# Patient Record
Sex: FEMALE | Race: WHITE | Employment: OTHER | URBAN - METROPOLITAN AREA
[De-identification: names, ages, dates, MRNs, and addresses within clinical notes are randomized per-mention and may not be internally consistent; named-entity substitution may affect disease eponyms.]

---

## 2017-03-18 ENCOUNTER — HOSPITAL ENCOUNTER (OUTPATIENT)
Age: 72
Discharge: HOME | End: 2017-03-18
Payer: MEDICARE

## 2017-03-18 VITALS
HEIGHT: 61 IN | TEMPERATURE: 98 F | HEART RATE: 67 BPM | OXYGEN SATURATION: 98 % | SYSTOLIC BLOOD PRESSURE: 141 MMHG | WEIGHT: 156 LBS | RESPIRATION RATE: 18 BRPM | DIASTOLIC BLOOD PRESSURE: 83 MMHG | BODY MASS INDEX: 29.45 KG/M2

## 2017-03-18 DIAGNOSIS — N30.01 ACUTE CYSTITIS WITH HEMATURIA: Primary | ICD-10-CM

## 2017-03-18 LAB
URINE BILIRUBIN: NEGATIVE
URINE GLUCOSE: NEGATIVE MG/DL
URINE KETONES: NEGATIVE MG/DL
URINE NITRITE: NEGATIVE
URINE PH: 6.5
URINE SPECIFIC GRAVITY: 1.02
URINE UROBILINOGEN: 0.2 MG/DL

## 2017-03-18 PROCEDURE — 87086 URINE CULTURE/COLONY COUNT: CPT | Performed by: FAMILY MEDICINE

## 2017-03-18 PROCEDURE — 99213 OFFICE O/P EST LOW 20 MIN: CPT

## 2017-03-18 PROCEDURE — 99212 OFFICE O/P EST SF 10 MIN: CPT

## 2017-03-18 PROCEDURE — 81002 URINALYSIS NONAUTO W/O SCOPE: CPT

## 2017-03-18 RX ORDER — CIPROFLOXACIN 250 MG/1
250 TABLET, FILM COATED ORAL 2 TIMES DAILY
Qty: 14 TABLET | Refills: 0 | Status: SHIPPED | OUTPATIENT
Start: 2017-03-18 | End: 2017-03-25

## 2017-03-18 NOTE — ED PROVIDER NOTES
Patient Seen in: [facility] In [address] [place] [address]    History   Patient presents with:  Urinary Symptoms (urologic)    Stated Complaint: uti    HPI  Patient comes with a 1 day h/o of dysuria, urgency and frequency.  No fevers, no back pain, no in [place]   • 2 sisters, 2 brothers [Other] Sarah Killian     • 3 children [Other] [OTHER]           Smoking Status: Never Smoker                      Alcohol Use: No                Review of Systems    Positive for stated complaint: uti  Other systems are as no

## 2017-03-18 NOTE — ED INITIAL ASSESSMENT (HPI)
Frequency, urgency, burning with urination and some blood noted with wiping today. Symptoms started yesterday. Denies any fevers/backpain/abdominal pain. Denies any nausea/vomiting.

## 2017-06-19 DIAGNOSIS — Z00.00 ANNUAL PHYSICAL EXAM: Primary | ICD-10-CM

## 2017-06-20 RX ORDER — LOSARTAN POTASSIUM AND HYDROCHLOROTHIAZIDE 12.5; 1 MG/1; MG/1
TABLET ORAL
Qty: 90 TABLET | Refills: 0 | Status: SHIPPED | OUTPATIENT
Start: 2017-06-20 | End: 2017-06-26

## 2017-06-20 NOTE — TELEPHONE ENCOUNTER
To [employer] - please call pt to schedule annual medicare physical this month if possible. Will need fasting labs which have been ordered per Dr. Jacinta Pineda 8/30/16 note. Thank you!

## 2017-06-26 ENCOUNTER — OFFICE VISIT (OUTPATIENT)
Dept: INTERNAL MEDICINE CLINIC | Facility: CLINIC | Age: 72
End: 2017-06-26

## 2017-06-26 ENCOUNTER — LAB ENCOUNTER (OUTPATIENT)
Dept: LAB | Age: 72
End: 2017-06-26
Attending: INTERNAL MEDICINE
Payer: MEDICARE

## 2017-06-26 VITALS
TEMPERATURE: 98 F | RESPIRATION RATE: 18 BRPM | DIASTOLIC BLOOD PRESSURE: 80 MMHG | SYSTOLIC BLOOD PRESSURE: 136 MMHG | BODY MASS INDEX: 27.81 KG/M2 | WEIGHT: 155 LBS | HEIGHT: 62.5 IN | OXYGEN SATURATION: 98 % | HEART RATE: 54 BPM

## 2017-06-26 DIAGNOSIS — E03.9 ACQUIRED HYPOTHYROIDISM: ICD-10-CM

## 2017-06-26 DIAGNOSIS — E66.3 OVERWEIGHT (BMI 25.0-29.9): ICD-10-CM

## 2017-06-26 DIAGNOSIS — Z00.00 ANNUAL PHYSICAL EXAM: ICD-10-CM

## 2017-06-26 DIAGNOSIS — Z00.00 MEDICARE ANNUAL WELLNESS VISIT, SUBSEQUENT: Primary | ICD-10-CM

## 2017-06-26 DIAGNOSIS — I10 HYPERTENSION, ESSENTIAL: ICD-10-CM

## 2017-06-26 PROCEDURE — 36415 COLL VENOUS BLD VENIPUNCTURE: CPT

## 2017-06-26 PROCEDURE — G0009 ADMIN PNEUMOCOCCAL VACCINE: HCPCS | Performed by: INTERNAL MEDICINE

## 2017-06-26 PROCEDURE — G0439 PPPS, SUBSEQ VISIT: HCPCS | Performed by: INTERNAL MEDICINE

## 2017-06-26 PROCEDURE — 90732 PPSV23 VACC 2 YRS+ SUBQ/IM: CPT | Performed by: INTERNAL MEDICINE

## 2017-06-26 PROCEDURE — 80061 LIPID PANEL: CPT

## 2017-06-26 PROCEDURE — 85025 COMPLETE CBC W/AUTO DIFF WBC: CPT

## 2017-06-26 PROCEDURE — 80053 COMPREHEN METABOLIC PANEL: CPT

## 2017-06-26 PROCEDURE — 84443 ASSAY THYROID STIM HORMONE: CPT

## 2017-06-26 PROCEDURE — 81001 URINALYSIS AUTO W/SCOPE: CPT

## 2017-06-26 RX ORDER — LOSARTAN POTASSIUM AND HYDROCHLOROTHIAZIDE 12.5; 1 MG/1; MG/1
TABLET ORAL
Qty: 90 TABLET | Refills: 3 | Status: SHIPPED | OUTPATIENT
Start: 2017-06-26 | End: 2018-07-23

## 2017-06-26 RX ORDER — LEVOTHYROXINE SODIUM 88 UG/1
88 TABLET ORAL
Qty: 90 TABLET | Refills: 3 | Status: SHIPPED | OUTPATIENT
Start: 2017-06-26 | End: 2018-07-23

## 2017-06-26 NOTE — PROGRESS NOTES
Larry Martinez is a 66year old female who presents for     Medicare annual wellness. Feels good. Was seen in [facility] care 3/18/17-treated with cipro 250 mg bid for 7 days. Sx cleared. hypertension --doing ok on meds.  Not checking home BPs.     hyp systems:  Constitutional:  No fever, loss of appetite or unintentional weight loss  Respiratory: No cough, wheezing or dyspnea  Cardiac: No chest pain or palpitations  Gastrointestinal: No abdominal pain, vomiting, diarrhea or constipation  Genitourinary: Flu shot this fall at [facility].   Advance directives: has living will and POA for [facility] [place], [facility]..    CT heart calcium score recom. Order given.      Lab 7/7/16-cbc cmp tsh lipid ua -results ok except TSH 6.99.   TSH normal on 9/27/16. Lab today-- cbc cmp tsh lipid ua.  ( activities? : No    Memory Problems?: No      Fall/Risk Assessment     Do you have 3 or more medical conditions?: 0-No    Have you fallen in the last 12 months?: 0-No    Do you accidently lose urine?: 0-No    Do you have difficulty seeing?: 0-No    Do you social activities because I cannot hear well and fear I will reply improperly:  No   Family members and friends have told me they think I may have hearing loss:   No              Visual Acuity     Right Eye Visual Acuity: Corrected Left Eye Visual Acuity: C

## 2017-06-26 NOTE — PROGRESS NOTES
Pt presents for Pneumonia injection. Full name and  verified. Pneumovax 23 0.5ml administered IM to left deltoid. Tolerated well.

## 2017-06-27 ENCOUNTER — TELEPHONE (OUTPATIENT)
Dept: INTERNAL MEDICINE CLINIC | Facility: CLINIC | Age: 72
End: 2017-06-27

## 2017-06-27 NOTE — TELEPHONE ENCOUNTER
To nursing, please tell pt lab done on 6/26/17-cbc cmp tsh lipid ua--results are ok except elevated cholesterol 229 and . Would like LDL <130. Watch fats in diet and work on weight loss. Thanks.        Note to self-- bilirubin 1.7 with normal oth

## 2017-09-22 ENCOUNTER — APPOINTMENT (OUTPATIENT)
Dept: LAB | Age: 72
End: 2017-09-22
Attending: INTERNAL MEDICINE
Payer: MEDICARE

## 2017-09-22 ENCOUNTER — TELEPHONE (OUTPATIENT)
Dept: INTERNAL MEDICINE CLINIC | Facility: CLINIC | Age: 72
End: 2017-09-22

## 2017-09-22 DIAGNOSIS — N39.0 ACUTE UTI: Primary | ICD-10-CM

## 2017-09-22 DIAGNOSIS — N39.0 ACUTE UTI: ICD-10-CM

## 2017-09-22 LAB
BACTERIA UR QL AUTO: NEGATIVE /HPF
BILIRUB UR QL: NEGATIVE
CLARITY UR: CLEAR
COLOR UR: YELLOW
GLUCOSE UR-MCNC: NEGATIVE MG/DL
KETONES UR-MCNC: NEGATIVE MG/DL
NITRITE UR QL STRIP.AUTO: NEGATIVE
PH UR: 6 [PH] (ref 5–8)
PROT UR-MCNC: NEGATIVE MG/DL
RBC #/AREA URNS AUTO: 1 /HPF
SP GR UR STRIP: 1 (ref 1–1.03)
UROBILINOGEN UR STRIP-ACNC: <2
VIT C UR-MCNC: NEGATIVE MG/DL
WBC #/AREA URNS AUTO: 13 /HPF

## 2017-09-22 PROCEDURE — 87086 URINE CULTURE/COLONY COUNT: CPT

## 2017-09-22 PROCEDURE — 81001 URINALYSIS AUTO W/SCOPE: CPT

## 2017-09-22 RX ORDER — NITROFURANTOIN 25; 75 MG/1; MG/1
100 CAPSULE ORAL 2 TIMES DAILY
Qty: 14 CAPSULE | Refills: 0 | Status: SHIPPED | OUTPATIENT
Start: 2017-09-22 | End: 2017-11-06 | Stop reason: ALTCHOICE

## 2017-09-22 NOTE — TELEPHONE ENCOUNTER
Dr. Ramsey Harrell' message relayed to pt's , Magaly Herring, who verbalized understanding and will relay to pt.

## 2017-09-22 NOTE — TELEPHONE ENCOUNTER
To nursing, I note patient called with UTI sx. I entered lab orders for UA/cx. start Macrobid 100 mg twice daily #14--rx sent to [facility] in [place]. Rajesh Alcaraz Please tell patient. Thanks.

## 2017-09-22 NOTE — TELEPHONE ENCOUNTER
This morning pt noticed burning when she urinates, and frequency. She has no pain, and no blood. She would like to have an order for urine test. She refused an appt at this time.      Tasked high to Nursing

## 2017-09-23 ENCOUNTER — HOSPITAL ENCOUNTER (OUTPATIENT)
Dept: CT IMAGING | Facility: HOSPITAL | Age: 72
Discharge: HOME | End: 2017-09-23
Attending: INTERNAL MEDICINE

## 2017-09-23 VITALS — HEART RATE: 56 BPM | DIASTOLIC BLOOD PRESSURE: 72 MMHG | SYSTOLIC BLOOD PRESSURE: 139 MMHG

## 2017-09-23 DIAGNOSIS — E03.9 ACQUIRED HYPOTHYROIDISM: ICD-10-CM

## 2017-09-23 DIAGNOSIS — I10 HYPERTENSION, ESSENTIAL: ICD-10-CM

## 2017-09-25 ENCOUNTER — TELEPHONE (OUTPATIENT)
Dept: INTERNAL MEDICINE CLINIC | Facility: CLINIC | Age: 72
End: 2017-09-25

## 2017-09-25 NOTE — TELEPHONE ENCOUNTER
To nursing, please tell pt urinalysis and urine culture 9/22/17--results are negative-no urinary tract infection was found. She can stop the Macrobid. If her sx of urinary burning and frequency persist, please see me in the office. Thanks.      Note to

## 2017-09-25 NOTE — TELEPHONE ENCOUNTER
Relayed MD message to patient. Patient verbalized understanded. Patient has increased water intake and is no longer having urinary sx.

## 2017-09-26 ENCOUNTER — TELEPHONE (OUTPATIENT)
Dept: INTERNAL MEDICINE CLINIC | Facility: CLINIC | Age: 72
End: 2017-09-26

## 2017-09-26 DIAGNOSIS — E78.00 HYPERCHOLESTEROLEMIA: Primary | ICD-10-CM

## 2017-09-26 RX ORDER — ATORVASTATIN CALCIUM 10 MG/1
10 TABLET, FILM COATED ORAL NIGHTLY
Qty: 90 TABLET | Refills: 3 | Status: SHIPPED | OUTPATIENT
Start: 2017-09-26 | End: 2018-07-23

## 2017-09-26 NOTE — TELEPHONE ENCOUNTER
To nursing,   please tell patient 9/23/17 CT heart calcium scoring result is 9. This is consistent with very low risk of coronary artery narrowing. This compares with a calcium score of 0 in 2012. This scan also noted some plaque in her aorta.   My recom

## 2017-09-26 NOTE — TELEPHONE ENCOUNTER
Dr. Mehreen Esposito' message relayed to pt who verbalized understanding. Lipitor eRx'd, copy of lab order mailed to pt's home.

## 2017-10-02 ENCOUNTER — OFFICE VISIT (OUTPATIENT)
Dept: INTERNAL MEDICINE CLINIC | Facility: CLINIC | Age: 72
End: 2017-10-02

## 2017-10-02 ENCOUNTER — APPOINTMENT (OUTPATIENT)
Dept: LAB | Age: 72
End: 2017-10-02
Attending: INTERNAL MEDICINE
Payer: MEDICARE

## 2017-10-02 ENCOUNTER — TELEPHONE (OUTPATIENT)
Dept: INTERNAL MEDICINE CLINIC | Facility: CLINIC | Age: 72
End: 2017-10-02

## 2017-10-02 VITALS
HEART RATE: 61 BPM | DIASTOLIC BLOOD PRESSURE: 82 MMHG | SYSTOLIC BLOOD PRESSURE: 104 MMHG | OXYGEN SATURATION: 98 % | TEMPERATURE: 98 F | BODY MASS INDEX: 27.6 KG/M2 | WEIGHT: 153.81 LBS | HEIGHT: 62.5 IN

## 2017-10-02 DIAGNOSIS — R30.0 DYSURIA: ICD-10-CM

## 2017-10-02 DIAGNOSIS — R31.0 GROSS HEMATURIA: Primary | ICD-10-CM

## 2017-10-02 DIAGNOSIS — R31.0 GROSS HEMATURIA: ICD-10-CM

## 2017-10-02 PROCEDURE — G0463 HOSPITAL OUTPT CLINIC VISIT: HCPCS | Performed by: INTERNAL MEDICINE

## 2017-10-02 PROCEDURE — 81001 URINALYSIS AUTO W/SCOPE: CPT

## 2017-10-02 PROCEDURE — 99213 OFFICE O/P EST LOW 20 MIN: CPT | Performed by: INTERNAL MEDICINE

## 2017-10-02 PROCEDURE — 87086 URINE CULTURE/COLONY COUNT: CPT

## 2017-10-02 NOTE — TELEPHONE ENCOUNTER
Pt. Woke up a 4 am with blood in her urine. she states her U/A was negative for a bladder infection 10 days ago. She stopped the medication and ended up taking nitrofurantoin mono 100 mg this morning please advise ph.  # [phone]   Routed to clinical

## 2017-10-02 NOTE — TELEPHONE ENCOUNTER
About a week ago patient reports she had frequency and burning. Patient had a UA at that time which came back negative so she was instructed to stop the antibiotic. Around 3am this morning she woke up with a burning sensation.   Around 4 am she noticed bl

## 2017-10-02 NOTE — PROGRESS NOTES
Iglesia Spain is a 66year old femalewho presents with     urinary symptoms. Pt called on 9/22/17- noticed burning when she urinates, and frequency. She no pain-no blood. She asked to have an order for urine test--declined appt.    Order was given for UA COLONOSCOPY      Comment: Dr Julius Rajan hemorrhoids  2012: ELECTROCARDIOGRAM, COMPLETE      Comment: Scanned to media tab - DOS 2012  No date: EYE SURGERY   Family History   Problem Relation Age of Onset   • Heart Disease Father       age 4 home.   CT urogram (neg urine cx's when has sx) and see dr. Donald Rodriguez again. call if not better. Patient expresses understanding of above issues and plan.          Current Outpatient Prescriptions:  atorvastatin 10 MG Oral Tab Take 1 tablet (10 mg to

## 2017-10-04 ENCOUNTER — TELEPHONE (OUTPATIENT)
Dept: INTERNAL MEDICINE CLINIC | Facility: CLINIC | Age: 72
End: 2017-10-04

## 2017-10-04 NOTE — TELEPHONE ENCOUNTER
Called patient and relayed Dr Michaela Anderson message to her. She verbalized understanding of results and instructions.

## 2017-10-04 NOTE — TELEPHONE ENCOUNTER
Nursing, please tell patient 10/2/17– urinalysis and urine culture are negative for urinary tract infection. Go ahead with the CT urogram as is scheduled and see Dr. Charla Andino as we discussed. Thanks.     Note to self–10/2/17- UA mod blood 18 WBCs 3 R

## 2017-10-10 ENCOUNTER — HOSPITAL ENCOUNTER (OUTPATIENT)
Dept: CT IMAGING | Facility: HOSPITAL | Age: 72
Discharge: HOME | End: 2017-10-10
Attending: INTERNAL MEDICINE
Payer: MEDICARE

## 2017-10-10 DIAGNOSIS — R31.0 GROSS HEMATURIA: ICD-10-CM

## 2017-10-10 PROCEDURE — 74176 CT ABD & PELVIS W/O CONTRAST: CPT | Performed by: INTERNAL MEDICINE

## 2017-10-10 PROCEDURE — 82565 ASSAY OF CREATININE: CPT

## 2017-10-11 ENCOUNTER — TELEPHONE (OUTPATIENT)
Dept: INTERNAL MEDICINE CLINIC | Facility: CLINIC | Age: 72
End: 2017-10-11

## 2017-10-11 DIAGNOSIS — N28.9 RENAL INSUFFICIENCY: Primary | ICD-10-CM

## 2017-10-12 ENCOUNTER — TELEPHONE (OUTPATIENT)
Dept: INTERNAL MEDICINE CLINIC | Facility: CLINIC | Age: 72
End: 2017-10-12

## 2017-10-12 ENCOUNTER — APPOINTMENT (OUTPATIENT)
Dept: LAB | Age: 72
End: 2017-10-12
Attending: INTERNAL MEDICINE
Payer: MEDICARE

## 2017-10-12 DIAGNOSIS — N28.9 RENAL INSUFFICIENCY: ICD-10-CM

## 2017-10-12 DIAGNOSIS — E78.00 HYPERCHOLESTEROLEMIA: ICD-10-CM

## 2017-10-12 PROCEDURE — 80048 BASIC METABOLIC PNL TOTAL CA: CPT

## 2017-10-12 PROCEDURE — 80061 LIPID PANEL: CPT

## 2017-10-12 PROCEDURE — 84450 TRANSFERASE (AST) (SGOT): CPT

## 2017-10-12 PROCEDURE — 36415 COLL VENOUS BLD VENIPUNCTURE: CPT

## 2017-10-12 NOTE — TELEPHONE ENCOUNTER
I called pt:  10/10/17–CT abd pelvis--results ok. CT was done without contrast due to POCT creat 1.4, GFR 37. Patient has appointment to see Dr. Jas Britt on 11/6/17 about the gross hematuria episodes.   Unclear why creatinine 1.4 and GFR 37 at the time o

## 2017-10-12 NOTE — TELEPHONE ENCOUNTER
To nursing, please tell pt lab done on 10/12/17-BMP, lipid, AST--results are ok. Kidney function is at her baseline--It is not clear why her Kidney fcn (creatinine) on fingerstick test at CT dept 2 days ago was higher.    The lab also did her cholesterol

## 2017-10-13 NOTE — TELEPHONE ENCOUNTER
Spoke with patient and relayed Dr. Alease Goltz' message. Patient verbalized understanding. No questions asked at this time. Patient will go and see. Dr Rene Araujo as planned on 11/06/2017.

## 2017-10-26 ENCOUNTER — HOSPITAL ENCOUNTER (OUTPATIENT)
Dept: MAMMOGRAPHY | Facility: HOSPITAL | Age: 72
Discharge: HOME | End: 2017-10-26
Attending: OBSTETRICS & GYNECOLOGY
Payer: MEDICARE

## 2017-10-26 ENCOUNTER — HOSPITAL ENCOUNTER (OUTPATIENT)
Dept: BONE DENSITY | Facility: HOSPITAL | Age: 72
Discharge: HOME | End: 2017-10-26
Attending: OBSTETRICS & GYNECOLOGY
Payer: MEDICARE

## 2017-10-26 DIAGNOSIS — M81.0 OSTEOPOROSIS: ICD-10-CM

## 2017-10-26 DIAGNOSIS — Z12.31 VISIT FOR SCREENING MAMMOGRAM: ICD-10-CM

## 2017-10-26 PROCEDURE — 77067 SCR MAMMO BI INCL CAD: CPT | Performed by: OBSTETRICS & GYNECOLOGY

## 2017-10-26 PROCEDURE — 77080 DXA BONE DENSITY AXIAL: CPT | Performed by: OBSTETRICS & GYNECOLOGY

## 2017-11-06 ENCOUNTER — OFFICE VISIT (OUTPATIENT)
Dept: SURGERY | Facility: CLINIC | Age: 72
End: 2017-11-06

## 2017-11-06 VITALS
BODY MASS INDEX: 29.45 KG/M2 | HEART RATE: 55 BPM | TEMPERATURE: 98 F | HEIGHT: 61 IN | SYSTOLIC BLOOD PRESSURE: 150 MMHG | DIASTOLIC BLOOD PRESSURE: 90 MMHG | WEIGHT: 156 LBS

## 2017-11-06 DIAGNOSIS — N30.90 BLADDER INFECTION: ICD-10-CM

## 2017-11-06 DIAGNOSIS — R31.0 GROSS HEMATURIA: ICD-10-CM

## 2017-11-06 DIAGNOSIS — R31.29 MICROHEMATURIA: ICD-10-CM

## 2017-11-06 DIAGNOSIS — R82.81 PYURIA: Primary | ICD-10-CM

## 2017-11-06 PROCEDURE — 99205 OFFICE O/P NEW HI 60 MIN: CPT | Performed by: UROLOGY

## 2017-11-06 PROCEDURE — G0463 HOSPITAL OUTPT CLINIC VISIT: HCPCS | Performed by: UROLOGY

## 2017-11-06 NOTE — PATIENT INSTRUCTIONS
1. Urine specimen for today for complete urinalysis, urine culture, and urine for cytology--to exclude cancer cells in the urine, culture to exclude possibility of UTI and analysis to monitor the pyuria and microhematuria.      2. To better understand the b likely to reach the bladder or kidneys in men. Date Last Reviewed: 1/1/2017  © 7325-4380 [employer]. [address] [facility], [address] [place] [address]. All rights reserved.  This information is not intended as a substitute for professional medical c

## 2017-11-06 NOTE — PROGRESS NOTES
Demetrius [facility] is a 67year old female. Reason for Consultation:   Patient presents with:  Consult: Pt presents with consult for blood in urine and UTIs. Denies urinary problems at present time         History provided by pt.       History of Present Illn October 2009 which came back negative. October 9, 2009 at [facility], IVP was negative as per Dr. Loan Dove. October 29, 2009 in the office, cystoscopy with dilation of postmenopausal urethral stenosis; no bladder tumors were found.  Lore Sellers Onset   • Heart Disease Father       age 93--\"old age\"   • Heart Disease Mother       age 67-suddenly    • Stroke Sister       age 48-\"stroke or aneurysm\"   • Ear Problems Brother      deafness   • Thyroid disease Sister      hypothyroi psychiatric symptoms  Respiratory:  Negative for cough, dyspnea and wheezing      PHYSICAL EXAM:   Constitutional: appears well hydrated alert and responsive no acute distress noted  Neurological: Oriented to time, place, person with normal affect  Exam ap blood; moderate leuk; negative nitrate  7/7/2016 UA RBC = <1; WBC = 3    Imaging:  10/10/2017 CT OF THE ABDOMEN AND PELVIS WITHOUT CONTRAST - RENAL STONE  = COMPARISON: [facility], CT PF UROGRAM, 5/08/2012;  fullness of the right renal pelv biopsy under local anesthesia and does not want any oral diazepam.      (N30.90) Bladder infection  Patient has had 3 episodes of UTI-like symptmos this past year; she had taken antibiotics before submitting urine culture 2 out of the 3 times.  She will rec prepared under the direction and in the presence of Subhash Barron MD.   Electronically Signed: Conception Fearing, 11/6/2017, 1:59 PM.

## 2017-12-11 ENCOUNTER — OFFICE VISIT (OUTPATIENT)
Dept: SURGERY | Facility: CLINIC | Age: 72
End: 2017-12-11

## 2017-12-11 VITALS
SYSTOLIC BLOOD PRESSURE: 132 MMHG | WEIGHT: 156 LBS | HEIGHT: 61 IN | DIASTOLIC BLOOD PRESSURE: 70 MMHG | BODY MASS INDEX: 29.45 KG/M2

## 2017-12-11 DIAGNOSIS — N39.0 RECURRENT UTI: ICD-10-CM

## 2017-12-11 DIAGNOSIS — R31.29 MICROHEMATURIA: Primary | ICD-10-CM

## 2017-12-11 PROCEDURE — 99212 OFFICE O/P EST SF 10 MIN: CPT | Performed by: UROLOGY

## 2017-12-11 PROCEDURE — 52000 CYSTOURETHROSCOPY: CPT | Performed by: UROLOGY

## 2017-12-11 NOTE — PROGRESS NOTES
PREOPERATIVE DIAGNOSIS:   Microhematuria, gross hematuria, bladder infection     POSTOP DIAGNOSIS:                The Same    PROCEDURE:              Cystoscopy              ANESTHESIA:     2% Xylocaine jelly local;  also see above    FINDINGS:  Presence o Dr. Edel Sainz, in consultation in the office October 6, 2009 for this reason. That day in the office, urinalysis showed 5-7 RBC/HPF. One episode of pyelonephritis at age 20, treated as an outpatient with antimicrobials.   Three bladder infections during her a CT OF THE ABDOMEN AND PELVIS WITHOUT CONTRAST - RENAL STONE  = COMPARISON: [facility], CT PF UROGRAM, 5/08/2012;  fullness of the right renal pelvis and right ureter, similar in appearance to previous study; BLADDER: Moderately distended; urination, please buy \"AZO\" over the counter medication for burning - take 1 tablet every 8 hours as needed for burning pain. It makes the color of the urine bright orange red.     3.  If you have symptoms of urinary tract infection/bladder function, sebastian

## 2017-12-11 NOTE — PATIENT INSTRUCTIONS
1. If your urine is bloody, please drink enough liquids to dilute out the blood. If the urine doesn't show any signs of blood, you can drink your usual amount of liquids.  Once there are no signs of blood, you can likely restart daily aspirin or other anti- fever. In this case, your doctor may want to repeat the urine test on another day. This will show if the blood is still present. If it is, then other tests can be done to find out the cause.   Home care  Follow these home care guidelines:  · If your urine d

## 2018-02-13 ENCOUNTER — TELEPHONE (OUTPATIENT)
Dept: INTERNAL MEDICINE CLINIC | Facility: CLINIC | Age: 73
End: 2018-02-13

## 2018-02-13 NOTE — TELEPHONE ENCOUNTER
Called patient. She reports having skin problems for the last 7 months. She reports never having had skin problems before. The areas are red and flat, sometimes she can feel a bump and there are clusters. They appear on both sides of her face.   She report

## 2018-02-13 NOTE — TELEPHONE ENCOUNTER
Pt is calling for the past 7 months her face is breaking out off and on with red patches. Pt said the only thing that has changed she started Lipitor, could this be the reason?   Pt would like to go off the medication to see if this is the reason  Please c

## 2018-02-14 ENCOUNTER — OFFICE VISIT (OUTPATIENT)
Dept: INTERNAL MEDICINE CLINIC | Facility: CLINIC | Age: 73
End: 2018-02-14

## 2018-02-14 ENCOUNTER — TELEPHONE (OUTPATIENT)
Dept: INTERNAL MEDICINE CLINIC | Facility: CLINIC | Age: 73
End: 2018-02-14

## 2018-02-14 VITALS
SYSTOLIC BLOOD PRESSURE: 130 MMHG | WEIGHT: 150 LBS | TEMPERATURE: 98 F | HEIGHT: 62.5 IN | BODY MASS INDEX: 26.91 KG/M2 | HEART RATE: 66 BPM | DIASTOLIC BLOOD PRESSURE: 76 MMHG | OXYGEN SATURATION: 97 %

## 2018-02-14 DIAGNOSIS — R21 FACIAL RASH: Primary | ICD-10-CM

## 2018-02-14 DIAGNOSIS — R21 FACIAL RASH: ICD-10-CM

## 2018-02-14 PROCEDURE — G0463 HOSPITAL OUTPT CLINIC VISIT: HCPCS | Performed by: INTERNAL MEDICINE

## 2018-02-14 PROCEDURE — 99213 OFFICE O/P EST LOW 20 MIN: CPT | Performed by: INTERNAL MEDICINE

## 2018-02-14 RX ORDER — METRONIDAZOLE 10 MG/G
1 GEL TOPICAL 2 TIMES DAILY
Qty: 30 G | Refills: 3 | Status: SHIPPED | OUTPATIENT
Start: 2018-02-14 | End: 2018-02-14

## 2018-02-14 RX ORDER — METRONIDAZOLE 10 MG/G
1 GEL TOPICAL 2 TIMES DAILY
Qty: 60 G | Refills: 3 | Status: SHIPPED | OUTPATIENT
Start: 2018-02-14 | End: 2020-07-29

## 2018-02-14 NOTE — TELEPHONE ENCOUNTER
[facility] calling to find out if they can change the qty to 60 gms, they do not have 30 gm.    Tasked to nursing

## 2018-02-14 NOTE — PROGRESS NOTES
Javi Rider is a 67year old female who presents for     Facial rash  Started 7 mo ago \"like blemishes\"--thought it was acne. Tried acne wash--used once--no help. Using Lillian cream on face and is washing face a lot with warm water.    The spots come [OTHER] Other    • 3 children [OTHER] Other       Social History:   Smoking status: Never Smoker                                                              Smokeless tobacco: Never Used                      Alcohol use:  No                   Allergies:  N

## 2018-02-14 NOTE — TELEPHONE ENCOUNTER
MetroNIDAZOLE 1 % External Gel 30 g 3 2/14/2018 6/14/2018   Sig :  Apply 1 Application topically 2 (two) times daily. Route:   Topical            I note above RX was sent today. To Dr Dena Cook to give authorization for 60 g?  Same amount of refill

## 2018-02-14 NOTE — TELEPHONE ENCOUNTER
the Lipitor was started in September (5 months ago)--patient reports the rash began 7 months ago. Will see patient in the office tomorrow as planned. Santos Davies

## 2018-07-18 ENCOUNTER — OFFICE VISIT (OUTPATIENT)
Dept: INTERNAL MEDICINE CLINIC | Facility: CLINIC | Age: 73
End: 2018-07-18
Payer: MEDICARE

## 2018-07-18 VITALS
SYSTOLIC BLOOD PRESSURE: 144 MMHG | WEIGHT: 156 LBS | DIASTOLIC BLOOD PRESSURE: 80 MMHG | HEART RATE: 64 BPM | HEIGHT: 62.5 IN | BODY MASS INDEX: 27.99 KG/M2 | TEMPERATURE: 99 F

## 2018-07-18 DIAGNOSIS — E78.00 HYPERCHOLESTEROLEMIA: ICD-10-CM

## 2018-07-18 DIAGNOSIS — I10 HYPERTENSION, ESSENTIAL: ICD-10-CM

## 2018-07-18 DIAGNOSIS — L71.9 ROSACEA: ICD-10-CM

## 2018-07-18 DIAGNOSIS — Z00.00 MEDICARE ANNUAL WELLNESS VISIT, SUBSEQUENT: Primary | ICD-10-CM

## 2018-07-18 DIAGNOSIS — E03.9 ACQUIRED HYPOTHYROIDISM: ICD-10-CM

## 2018-07-18 PROCEDURE — G0463 HOSPITAL OUTPT CLINIC VISIT: HCPCS | Performed by: INTERNAL MEDICINE

## 2018-07-18 PROCEDURE — G0439 PPPS, SUBSEQ VISIT: HCPCS | Performed by: INTERNAL MEDICINE

## 2018-07-18 PROCEDURE — 99214 OFFICE O/P EST MOD 30 MIN: CPT | Performed by: INTERNAL MEDICINE

## 2018-07-18 NOTE — PROGRESS NOTES
Mariposa Kidd is a 67year old female who presents for     Medicare annual wellness.     Feels good.      Facial rash from 2/14/18 visit is \"under control\". metrogel \"takes the red out. No bad breakouts like I had in the beginning. \" didn't see derm. Thyroid disease Sister      hypothyroidism   • Heart Disorder Brother      heart stopped in cold temps in [place]   • 2 sisters, 2 brothers Ervin Baez Other    • 3 children [OTHER] Other       Social History:   Smoking status: Never Smoker    Hx Microhematuria--had eval 2009 and  May/June 2012 Dr. Bill Lee urogram and cysto was ok in 2012.   10/10/17–CT abd pelvis--results ok. CT was done without contrast due to POCT creat 1.4, GFR 37. Cysto 12/2017 per Dr. Rupali Larose ok.  He gave sta Kathryn Mcguire MD  7/18/2018         General Health     In the past six months, have you lost more than 10 pounds without trying?: 2 - No    Has your appetite been poor?: No    Type of Diet: Balanced    How does the patient maintain a good energy the telephone:  No I have trouble following the conversations when two or more people are talking at the same time:  No   I have trouble understanding things on the TV:  No I have to strain to understand conversations:  No   I have to worry about missing t

## 2018-07-19 ENCOUNTER — LAB ENCOUNTER (OUTPATIENT)
Dept: LAB | Age: 73
End: 2018-07-19
Attending: INTERNAL MEDICINE
Payer: MEDICARE

## 2018-07-19 DIAGNOSIS — I10 HYPERTENSION, ESSENTIAL: ICD-10-CM

## 2018-07-19 LAB
ALBUMIN SERPL BCP-MCNC: 4 G/DL (ref 3.5–4.8)
ALBUMIN/GLOB SERPL: 1.5 {RATIO} (ref 1–2)
ALP SERPL-CCNC: 61 U/L (ref 32–100)
ALT SERPL-CCNC: 20 U/L (ref 14–54)
ANION GAP SERPL CALC-SCNC: 9 MMOL/L (ref 0–18)
AST SERPL-CCNC: 25 U/L (ref 15–41)
BACTERIA UR QL AUTO: NEGATIVE /HPF
BASOPHILS # BLD: 0.1 K/UL (ref 0–0.2)
BASOPHILS NFR BLD: 1 %
BILIRUB SERPL-MCNC: 1.4 MG/DL (ref 0.3–1.2)
BILIRUB UR QL: NEGATIVE
BUN SERPL-MCNC: 20 MG/DL (ref 8–20)
BUN/CREAT SERPL: 17.7 (ref 10–20)
CALCIUM SERPL-MCNC: 9.4 MG/DL (ref 8.5–10.5)
CHLORIDE SERPL-SCNC: 105 MMOL/L (ref 95–110)
CHOLEST SERPL-MCNC: 141 MG/DL (ref 110–200)
CLARITY UR: CLEAR
CO2 SERPL-SCNC: 26 MMOL/L (ref 22–32)
COLOR UR: YELLOW
CREAT SERPL-MCNC: 1.13 MG/DL (ref 0.5–1.5)
EOSINOPHIL # BLD: 0.4 K/UL (ref 0–0.7)
EOSINOPHIL NFR BLD: 5 %
ERYTHROCYTE [DISTWIDTH] IN BLOOD BY AUTOMATED COUNT: 14 % (ref 11–15)
GLOBULIN PLAS-MCNC: 2.7 G/DL (ref 2.5–3.7)
GLUCOSE SERPL-MCNC: 93 MG/DL (ref 70–99)
GLUCOSE UR-MCNC: NEGATIVE MG/DL
HCT VFR BLD AUTO: 37.9 % (ref 35–48)
HDLC SERPL-MCNC: 58 MG/DL
HGB BLD-MCNC: 12.8 G/DL (ref 12–16)
KETONES UR-MCNC: NEGATIVE MG/DL
LDLC SERPL CALC-MCNC: 67 MG/DL (ref 0–99)
LYMPHOCYTES # BLD: 2.3 K/UL (ref 1–4)
LYMPHOCYTES NFR BLD: 29 %
MCH RBC QN AUTO: 29.3 PG (ref 27–32)
MCHC RBC AUTO-ENTMCNC: 33.7 G/DL (ref 32–37)
MCV RBC AUTO: 86.9 FL (ref 80–100)
MONOCYTES # BLD: 0.8 K/UL (ref 0–1)
MONOCYTES NFR BLD: 10 %
NEUTROPHILS # BLD AUTO: 4.4 K/UL (ref 1.8–7.7)
NEUTROPHILS NFR BLD: 56 %
NITRITE UR QL STRIP.AUTO: NEGATIVE
NONHDLC SERPL-MCNC: 83 MG/DL
OSMOLALITY UR CALC.SUM OF ELEC: 292 MOSM/KG (ref 275–295)
PATIENT FASTING: YES
PH UR: 6 [PH] (ref 5–8)
PLATELET # BLD AUTO: 241 K/UL (ref 140–400)
PMV BLD AUTO: 8.9 FL (ref 7.4–10.3)
POTASSIUM SERPL-SCNC: 3.9 MMOL/L (ref 3.3–5.1)
PROT SERPL-MCNC: 6.7 G/DL (ref 5.9–8.4)
PROT UR-MCNC: NEGATIVE MG/DL
RBC # BLD AUTO: 4.35 M/UL (ref 3.7–5.4)
RBC #/AREA URNS AUTO: 2 /HPF
SODIUM SERPL-SCNC: 140 MMOL/L (ref 136–144)
SP GR UR STRIP: 1.01 (ref 1–1.03)
TRIGL SERPL-MCNC: 80 MG/DL (ref 1–149)
TSH SERPL-ACNC: 2.58 UIU/ML (ref 0.45–5.33)
UROBILINOGEN UR STRIP-ACNC: <2
VIT C UR-MCNC: NEGATIVE MG/DL
WBC # BLD AUTO: 7.9 K/UL (ref 4–11)
WBC #/AREA URNS AUTO: 1 /HPF

## 2018-07-19 PROCEDURE — 84443 ASSAY THYROID STIM HORMONE: CPT

## 2018-07-19 PROCEDURE — 80061 LIPID PANEL: CPT

## 2018-07-19 PROCEDURE — 80053 COMPREHEN METABOLIC PANEL: CPT

## 2018-07-19 PROCEDURE — 81001 URINALYSIS AUTO W/SCOPE: CPT | Performed by: INTERNAL MEDICINE

## 2018-07-19 PROCEDURE — 85025 COMPLETE CBC W/AUTO DIFF WBC: CPT

## 2018-07-19 PROCEDURE — 36415 COLL VENOUS BLD VENIPUNCTURE: CPT

## 2018-07-23 ENCOUNTER — TELEPHONE (OUTPATIENT)
Dept: INTERNAL MEDICINE CLINIC | Facility: CLINIC | Age: 73
End: 2018-07-23

## 2018-07-23 RX ORDER — LEVOTHYROXINE SODIUM 88 UG/1
88 TABLET ORAL
Qty: 90 TABLET | Refills: 3 | Status: SHIPPED | OUTPATIENT
Start: 2018-07-23 | End: 2019-08-02

## 2018-07-23 RX ORDER — LOSARTAN POTASSIUM AND HYDROCHLOROTHIAZIDE 12.5; 1 MG/1; MG/1
TABLET ORAL
Qty: 90 TABLET | Refills: 3 | Status: SHIPPED | OUTPATIENT
Start: 2018-07-23 | End: 2019-08-20

## 2018-07-23 RX ORDER — ATORVASTATIN CALCIUM 10 MG/1
10 TABLET, FILM COATED ORAL NIGHTLY
Qty: 90 TABLET | Refills: 3 | Status: SHIPPED | OUTPATIENT
Start: 2018-07-23 | End: 2019-08-20

## 2018-07-23 NOTE — TELEPHONE ENCOUNTER
Pt needs refills on Atorvastatin 10 mg, QTY-90; Levothyroxine 88 mcg, QTY-90; Losartan -HCTZ 100-12.5 mg. Please send to [facility] in [place].      To Rx

## 2018-07-24 ENCOUNTER — TELEPHONE (OUTPATIENT)
Dept: INTERNAL MEDICINE CLINIC | Facility: CLINIC | Age: 73
End: 2018-07-24

## 2018-07-24 NOTE — TELEPHONE ENCOUNTER
To nursing, please tell pt lab done on 7/19/18–CBC CMP TSH lipid UA–results are ok and stable to past.  Thanks.

## 2018-10-30 ENCOUNTER — HOSPITAL ENCOUNTER (OUTPATIENT)
Dept: MAMMOGRAPHY | Facility: HOSPITAL | Age: 73
Discharge: HOME | End: 2018-10-30
Attending: OBSTETRICS & GYNECOLOGY
Payer: MEDICARE

## 2018-10-30 DIAGNOSIS — Z12.31 ENCOUNTER FOR SCREENING MAMMOGRAM FOR MALIGNANT NEOPLASM OF BREAST: ICD-10-CM

## 2018-10-30 PROCEDURE — 77067 SCR MAMMO BI INCL CAD: CPT | Performed by: OBSTETRICS & GYNECOLOGY

## 2018-10-30 PROCEDURE — 77063 BREAST TOMOSYNTHESIS BI: CPT | Performed by: OBSTETRICS & GYNECOLOGY

## 2018-11-20 PROCEDURE — 88305 TISSUE EXAM BY PATHOLOGIST: CPT | Performed by: INTERNAL MEDICINE

## 2019-08-02 RX ORDER — LEVOTHYROXINE SODIUM 88 UG/1
TABLET ORAL
Qty: 90 TABLET | Refills: 0 | Status: SHIPPED | OUTPATIENT
Start: 2019-08-02 | End: 2019-08-20

## 2019-08-02 NOTE — TELEPHONE ENCOUNTER
Annual 8/2019    Refill request is for a maintenance medication and has met the criteria specified in the Ambulatory Medication Refill Standing Order for eligibility, visits, laboratory, alerts and was sent to the requested pharmacy.     Requested Prescript

## 2019-08-20 ENCOUNTER — OFFICE VISIT (OUTPATIENT)
Dept: INTERNAL MEDICINE CLINIC | Facility: CLINIC | Age: 74
End: 2019-08-20
Payer: MEDICARE

## 2019-08-20 VITALS
OXYGEN SATURATION: 98 % | BODY MASS INDEX: 29.45 KG/M2 | HEART RATE: 60 BPM | DIASTOLIC BLOOD PRESSURE: 86 MMHG | WEIGHT: 156 LBS | HEIGHT: 61 IN | SYSTOLIC BLOOD PRESSURE: 144 MMHG | TEMPERATURE: 98 F

## 2019-08-20 DIAGNOSIS — E03.9 ACQUIRED HYPOTHYROIDISM: ICD-10-CM

## 2019-08-20 DIAGNOSIS — E78.00 HYPERCHOLESTEROLEMIA: ICD-10-CM

## 2019-08-20 DIAGNOSIS — I10 HYPERTENSION, ESSENTIAL: ICD-10-CM

## 2019-08-20 DIAGNOSIS — Z00.00 MEDICARE ANNUAL WELLNESS VISIT, SUBSEQUENT: Primary | ICD-10-CM

## 2019-08-20 PROCEDURE — 99214 OFFICE O/P EST MOD 30 MIN: CPT | Performed by: INTERNAL MEDICINE

## 2019-08-20 PROCEDURE — G0439 PPPS, SUBSEQ VISIT: HCPCS | Performed by: INTERNAL MEDICINE

## 2019-08-20 PROCEDURE — G0463 HOSPITAL OUTPT CLINIC VISIT: HCPCS | Performed by: INTERNAL MEDICINE

## 2019-08-20 RX ORDER — LOSARTAN POTASSIUM AND HYDROCHLOROTHIAZIDE 12.5; 1 MG/1; MG/1
TABLET ORAL
Qty: 90 TABLET | Refills: 3 | Status: SHIPPED | OUTPATIENT
Start: 2019-08-20 | End: 2020-01-24 | Stop reason: RX

## 2019-08-20 RX ORDER — LEVOTHYROXINE SODIUM 88 UG/1
TABLET ORAL
Qty: 90 TABLET | Refills: 3 | Status: SHIPPED | OUTPATIENT
Start: 2019-08-20 | End: 2020-11-02

## 2019-08-20 RX ORDER — ATORVASTATIN CALCIUM 10 MG/1
10 TABLET, FILM COATED ORAL NIGHTLY
Qty: 90 TABLET | Refills: 3 | Status: SHIPPED | OUTPATIENT
Start: 2019-08-20 | End: 2020-09-28

## 2019-08-20 NOTE — PROGRESS NOTES
Tristin Lindsey is a 75year old female who presents for     Medicare annual wellness.     Feels good. hypertension --doing ok on meds. Home BP recently 109-138/60s.    hypothyroidism -synthroid 88 mcg daily.     Hypercholesterolemia--tolerating lipitor    • Stroke Sister          age 48-\"stroke or aneurysm\"   • Ear Problems Brother         deafness   • Thyroid disease Sister         hypothyroidism   • Heart Disorder Brother         heart stopped in cold temps in [place]   • Other (2 sisters, 2 b mg daily. LDL 67 on 7/19/18. Check lipids.      Hx Microhematuria--had eval 2009 and May/June 2012 Dr. Josiane Hitchcock urogram and cysto ok 2012.   10/10/17–CT abd pelvis--results ok. CT done w/o contrast due to POCT creat 1.4, GFR 37.   Cysto 12/2017 per No    Has your appetite been poor?: (P) No    Type of Diet: (P) Balanced         How would you describe your daily physical activity?: (P) Moderate    How would you describe your current health state?: (P) Good    How do you maintain positive mental well-b 0        Advance Directives     Do you have a healthcare power of ?: (P) Yes    Do you have a living will?: (P) Yes     Hearing Assessment (Required for AWV/SWV)      Hearing Screening    Screening Method:  Questionnaire  I have a problem hearing o

## 2019-08-23 ENCOUNTER — TELEPHONE (OUTPATIENT)
Dept: INTERNAL MEDICINE CLINIC | Facility: CLINIC | Age: 74
End: 2019-08-23

## 2019-08-23 ENCOUNTER — LAB ENCOUNTER (OUTPATIENT)
Dept: LAB | Age: 74
End: 2019-08-23
Attending: INTERNAL MEDICINE
Payer: MEDICARE

## 2019-08-23 DIAGNOSIS — I10 HYPERTENSION, ESSENTIAL: ICD-10-CM

## 2019-08-23 LAB
ALBUMIN SERPL-MCNC: 3.7 G/DL (ref 3.4–5)
ALBUMIN/GLOB SERPL: 1.2 {RATIO} (ref 1–2)
ALP LIVER SERPL-CCNC: 69 U/L (ref 55–142)
ALT SERPL-CCNC: 16 U/L (ref 13–56)
ANION GAP SERPL CALC-SCNC: 9 MMOL/L (ref 0–18)
AST SERPL-CCNC: 18 U/L (ref 15–37)
BACTERIA UR QL AUTO: NEGATIVE /HPF
BASOPHILS # BLD AUTO: 0.05 X10(3) UL (ref 0–0.2)
BASOPHILS NFR BLD AUTO: 0.7 %
BILIRUB SERPL-MCNC: 1.1 MG/DL (ref 0.1–2)
BILIRUB UR QL: NEGATIVE
BUN BLD-MCNC: 24 MG/DL (ref 7–18)
BUN/CREAT SERPL: 20.7 (ref 10–20)
CALCIUM BLD-MCNC: 9 MG/DL (ref 8.5–10.1)
CHLORIDE SERPL-SCNC: 109 MMOL/L (ref 98–112)
CHOLEST SMN-MCNC: 121 MG/DL (ref ?–200)
CLARITY UR: CLEAR
CO2 SERPL-SCNC: 25 MMOL/L (ref 21–32)
COLOR UR: YELLOW
CREAT BLD-MCNC: 1.16 MG/DL (ref 0.55–1.02)
DEPRECATED RDW RBC AUTO: 41 FL (ref 35.1–46.3)
EOSINOPHIL # BLD AUTO: 0.32 X10(3) UL (ref 0–0.7)
EOSINOPHIL NFR BLD AUTO: 4.3 %
ERYTHROCYTE [DISTWIDTH] IN BLOOD BY AUTOMATED COUNT: 12.8 % (ref 11–15)
GLOBULIN PLAS-MCNC: 3.1 G/DL (ref 2.8–4.4)
GLUCOSE BLD-MCNC: 99 MG/DL (ref 70–99)
GLUCOSE UR-MCNC: NEGATIVE MG/DL
HCT VFR BLD AUTO: 37.8 % (ref 35–48)
HDLC SERPL-MCNC: 52 MG/DL (ref 40–59)
HGB BLD-MCNC: 12.3 G/DL (ref 12–16)
HYALINE CASTS #/AREA URNS AUTO: 1 /LPF
IMM GRANULOCYTES # BLD AUTO: 0.03 X10(3) UL (ref 0–1)
IMM GRANULOCYTES NFR BLD: 0.4 %
KETONES UR-MCNC: NEGATIVE MG/DL
LDLC SERPL CALC-MCNC: 49 MG/DL (ref ?–100)
LYMPHOCYTES # BLD AUTO: 2.39 X10(3) UL (ref 1–4)
LYMPHOCYTES NFR BLD AUTO: 32.3 %
M PROTEIN MFR SERPL ELPH: 6.8 G/DL (ref 6.4–8.2)
MCH RBC QN AUTO: 28.7 PG (ref 26–34)
MCHC RBC AUTO-ENTMCNC: 32.5 G/DL (ref 31–37)
MCV RBC AUTO: 88.1 FL (ref 80–100)
MONOCYTES # BLD AUTO: 0.8 X10(3) UL (ref 0.1–1)
MONOCYTES NFR BLD AUTO: 10.8 %
NEUTROPHILS # BLD AUTO: 3.81 X10 (3) UL (ref 1.5–7.7)
NEUTROPHILS # BLD AUTO: 3.81 X10(3) UL (ref 1.5–7.7)
NEUTROPHILS NFR BLD AUTO: 51.5 %
NITRITE UR QL STRIP.AUTO: NEGATIVE
NONHDLC SERPL-MCNC: 69 MG/DL (ref ?–130)
OSMOLALITY SERPL CALC.SUM OF ELEC: 300 MOSM/KG (ref 275–295)
PATIENT FASTING: YES
PATIENT FASTING: YES
PH UR: 7 [PH] (ref 5–8)
PLATELET # BLD AUTO: 248 10(3)UL (ref 150–450)
POTASSIUM SERPL-SCNC: 4 MMOL/L (ref 3.5–5.1)
PROT UR-MCNC: NEGATIVE MG/DL
RBC # BLD AUTO: 4.29 X10(6)UL (ref 3.8–5.3)
RBC #/AREA URNS AUTO: 2 /HPF
SODIUM SERPL-SCNC: 143 MMOL/L (ref 136–145)
SP GR UR STRIP: 1.01 (ref 1–1.03)
TRIGL SERPL-MCNC: 99 MG/DL (ref 30–149)
TSI SER-ACNC: 0.7 MIU/ML (ref 0.36–3.74)
UROBILINOGEN UR STRIP-ACNC: <2
VIT C UR-MCNC: NEGATIVE MG/DL
VLDLC SERPL CALC-MCNC: 20 MG/DL (ref 0–30)
WBC # BLD AUTO: 7.4 X10(3) UL (ref 4–11)
WBC #/AREA URNS AUTO: 3 /HPF

## 2019-08-23 PROCEDURE — 36415 COLL VENOUS BLD VENIPUNCTURE: CPT

## 2019-08-23 PROCEDURE — 81001 URINALYSIS AUTO W/SCOPE: CPT | Performed by: INTERNAL MEDICINE

## 2019-08-23 PROCEDURE — 80061 LIPID PANEL: CPT

## 2019-08-23 PROCEDURE — 85025 COMPLETE CBC W/AUTO DIFF WBC: CPT

## 2019-08-23 PROCEDURE — 84443 ASSAY THYROID STIM HORMONE: CPT

## 2019-08-23 PROCEDURE — 80053 COMPREHEN METABOLIC PANEL: CPT

## 2019-08-23 NOTE — TELEPHONE ENCOUNTER
Spoke to patient and relayed MD message and instructions, patient verbalizes understanding and agrees to call back next month with BP readings.

## 2019-08-23 NOTE — TELEPHONE ENCOUNTER
To nursing, please tell patient blood and urine test on 8/23/19–results are okay. Reminder to call us her home blood pressures next month. Thanks. 8/23/19--CBC CMP TSH lipid UA–--results okay.

## 2019-11-18 ENCOUNTER — HOSPITAL ENCOUNTER (OUTPATIENT)
Dept: MAMMOGRAPHY | Facility: HOSPITAL | Age: 74
Discharge: HOME | End: 2019-11-18
Attending: OBSTETRICS & GYNECOLOGY
Payer: MEDICARE

## 2019-11-18 DIAGNOSIS — Z12.31 ENCOUNTER FOR SCREENING MAMMOGRAM FOR MALIGNANT NEOPLASM OF BREAST: ICD-10-CM

## 2019-11-18 PROCEDURE — 77063 BREAST TOMOSYNTHESIS BI: CPT | Performed by: OBSTETRICS & GYNECOLOGY

## 2019-11-18 PROCEDURE — 77067 SCR MAMMO BI INCL CAD: CPT | Performed by: OBSTETRICS & GYNECOLOGY

## 2019-12-23 RX ORDER — LOSARTAN POTASSIUM AND HYDROCHLOROTHIAZIDE 12.5; 1 MG/1; MG/1
TABLET ORAL
Qty: 90 TABLET | Refills: 3 | OUTPATIENT
Start: 2019-12-23

## 2019-12-23 NOTE — TELEPHONE ENCOUNTER
Current refill request refused due to refill is either a duplicate request or has active refills at the pharmacy. Check previous templates.     Requested Prescriptions     Refused Prescriptions Disp Refills   • Losartan Potassium-HCTZ 100-12.5 MG Oral Tab

## 2020-01-21 ENCOUNTER — TELEPHONE (OUTPATIENT)
Dept: INTERNAL MEDICINE CLINIC | Facility: CLINIC | Age: 75
End: 2020-01-21

## 2020-01-23 NOTE — TELEPHONE ENCOUNTER
[facility] called back following up on a previous call stating they do not have any Losartan Potassium and they don't know when or if they will get it. They are looking for an alternative medication. Please send new RX to [facility] in [place].

## 2020-01-24 RX ORDER — HYDROCHLOROTHIAZIDE 12.5 MG/1
12.5 TABLET ORAL DAILY
Qty: 90 TABLET | Refills: 2 | Status: SHIPPED | OUTPATIENT
Start: 2020-01-24 | End: 2020-11-02

## 2020-01-24 RX ORDER — LOSARTAN POTASSIUM 100 MG/1
100 TABLET ORAL DAILY
Qty: 90 TABLET | Refills: 2 | Status: SHIPPED
Start: 2020-01-24 | End: 2020-11-02

## 2020-07-29 ENCOUNTER — OFFICE VISIT (OUTPATIENT)
Dept: INTERNAL MEDICINE CLINIC | Facility: CLINIC | Age: 75
End: 2020-07-29
Payer: MEDICARE

## 2020-07-29 ENCOUNTER — APPOINTMENT (OUTPATIENT)
Dept: LAB | Age: 75
End: 2020-07-29
Attending: INTERNAL MEDICINE
Payer: MEDICARE

## 2020-07-29 VITALS
OXYGEN SATURATION: 97 % | HEIGHT: 61 IN | TEMPERATURE: 98 F | BODY MASS INDEX: 30.09 KG/M2 | DIASTOLIC BLOOD PRESSURE: 86 MMHG | WEIGHT: 159.38 LBS | SYSTOLIC BLOOD PRESSURE: 138 MMHG | HEART RATE: 76 BPM

## 2020-07-29 DIAGNOSIS — N39.0 ACUTE UTI: Primary | ICD-10-CM

## 2020-07-29 DIAGNOSIS — N39.0 ACUTE UTI: ICD-10-CM

## 2020-07-29 LAB
BILIRUB UR QL: NEGATIVE
CLARITY UR: CLEAR
COLOR UR: YELLOW
GLUCOSE UR-MCNC: NEGATIVE MG/DL
KETONES UR-MCNC: NEGATIVE MG/DL
NITRITE UR QL STRIP.AUTO: NEGATIVE
PH UR: 7 [PH] (ref 5–8)
PROT UR-MCNC: NEGATIVE MG/DL
RBC #/AREA URNS AUTO: 4 /HPF
SP GR UR STRIP: 1 (ref 1–1.03)
UROBILINOGEN UR STRIP-ACNC: <2
WBC #/AREA URNS AUTO: 26 /HPF

## 2020-07-29 PROCEDURE — 87186 SC STD MICRODIL/AGAR DIL: CPT

## 2020-07-29 PROCEDURE — 87086 URINE CULTURE/COLONY COUNT: CPT

## 2020-07-29 PROCEDURE — 99213 OFFICE O/P EST LOW 20 MIN: CPT | Performed by: INTERNAL MEDICINE

## 2020-07-29 PROCEDURE — 87077 CULTURE AEROBIC IDENTIFY: CPT

## 2020-07-29 PROCEDURE — 81001 URINALYSIS AUTO W/SCOPE: CPT

## 2020-07-29 PROCEDURE — G0463 HOSPITAL OUTPT CLINIC VISIT: HCPCS | Performed by: INTERNAL MEDICINE

## 2020-07-29 RX ORDER — CEPHALEXIN 500 MG/1
500 CAPSULE ORAL 2 TIMES DAILY
Qty: 14 CAPSULE | Refills: 0 | Status: SHIPPED | OUTPATIENT
Start: 2020-07-29 | End: 2020-08-14 | Stop reason: ALTCHOICE

## 2020-07-29 NOTE — PROGRESS NOTES
Mariposa Kidd is a 75year old femalewho presents with     urinary symptoms. Onset: 1 wk ago  Started with: freq urination and burning--sx came and went--until last night sx more constant. Symptoms: dysuria, frequency  No urgency. No cloudy urine.  No deafness   • Thyroid disease Sister         hypothyroidism   • Heart Disorder Brother         heart stopped in cold temps in [place]   • Other (2 sisters, 2 brothers) Other    • Other (3 children) Other       Social History:   Social History    Tobacco Oral Tab Take 1 tablet (10 mg total) by mouth nightly.  90 tablet 3   • Levothyroxine Sodium 88 MCG Oral Tab TAKE 1 TABLET BY MOUTH BEFORE BREAKFAST 90 tablet 3         Princess Mansfield MD  7/29/2020

## 2020-07-31 ENCOUNTER — TELEPHONE (OUTPATIENT)
Dept: INTERNAL MEDICINE CLINIC | Facility: CLINIC | Age: 75
End: 2020-07-31

## 2020-07-31 NOTE — TELEPHONE ENCOUNTER
To nursing, please tell patient urine test confirms a UTI the cephalexin should take care of. Thanks. Note to self–  7/29/20–UA 26 WBC 4 RBC. Ucx--10–50 K E. coli, pansensitive. Finish Keflex.

## 2020-08-14 ENCOUNTER — OFFICE VISIT (OUTPATIENT)
Dept: DERMATOLOGY CLINIC | Facility: CLINIC | Age: 75
End: 2020-08-14
Payer: MEDICARE

## 2020-08-14 DIAGNOSIS — L65.9 ALOPECIA: Primary | ICD-10-CM

## 2020-08-14 DIAGNOSIS — L29.9 PRURITUS: ICD-10-CM

## 2020-08-14 PROCEDURE — 99213 OFFICE O/P EST LOW 20 MIN: CPT | Performed by: DERMATOLOGY

## 2020-08-14 PROCEDURE — G0463 HOSPITAL OUTPT CLINIC VISIT: HCPCS | Performed by: DERMATOLOGY

## 2020-08-14 RX ORDER — BETAMETHASONE VALERATE 0.1 %
LOTION (ML) TOPICAL
Qty: 60 ML | Refills: 3 | Status: SHIPPED | OUTPATIENT
Start: 2020-08-14 | End: 2021-05-12

## 2020-08-14 NOTE — PATIENT INSTRUCTIONS
B. vitamin supplementation--b complex vitamin   biotin to 10 mg daily,stop 3-5 days prior to blood tests   vitamin D3 2000 units daily,   adequate protein intake (40 to 80 g daily)  use of volumizing shampoos and antidandruff shampoos as appropriate.    Us

## 2020-08-23 NOTE — PROGRESS NOTES
Fara Bell is a 75year old female. Patient presents with:  Alopecia: LOV 6/8/2015. Pt presenting with hair loss. Pt states started in may or june 2020. Currently using Rogaine. Patient has no known allergies.   Current Outpatient [facility] hydrochlorothiazide 12.5 MG Oral Tab Take 1 tablet (12.5 mg total) by mouth daily. 90 tablet 2   • losartan 100 MG Oral Tab Take 1 tablet (100 mg total) by mouth daily.  90 tablet 2   • atorvastatin 10 MG Oral Tab Take 1 tablet (10 mg total) by mouth nightl resource strain: Not on file      Food insecurity:        Worry: Not on file        Inability: Not on file      Transportation needs:        Medical: Not on file        Non-medical: Not on file    Tobacco Use      Smoking status: Never Smoker      [place] age\"   • Heart Disease Mother          age 67-suddenly    • Stroke Sister          age 48-\"stroke or aneurysm\"   • Ear Problems Brother         deafness   • Thyroid disease Sister         hypothyroidism   • Heart Disorder Brother         hea pattern suggestive of female pattern hair loss. Consider she has been effluvium in the differential.     Betamethasone twice daily for inflammatory component may increase the minoxidil to twice a day.       Overall hair regimen discussed including B. vitam Refills   • Betamethasone Valerate 0.1 % External Lotion 60 mL 3     Sig: Use qd to scalp       Alopecia  (primary encounter diagnosis)  Pruritus    Orders Placed This Encounter      Ferritin      Results From Past 48 Hours:  No results found for this or a

## 2020-08-24 ENCOUNTER — LAB ENCOUNTER (OUTPATIENT)
Dept: LAB | Age: 75
End: 2020-08-24
Attending: INTERNAL MEDICINE
Payer: MEDICARE

## 2020-08-24 ENCOUNTER — OFFICE VISIT (OUTPATIENT)
Dept: INTERNAL MEDICINE CLINIC | Facility: CLINIC | Age: 75
End: 2020-08-24
Payer: MEDICARE

## 2020-08-24 VITALS
BODY MASS INDEX: 30.06 KG/M2 | WEIGHT: 159.19 LBS | DIASTOLIC BLOOD PRESSURE: 70 MMHG | SYSTOLIC BLOOD PRESSURE: 128 MMHG | HEART RATE: 58 BPM | HEIGHT: 61 IN | TEMPERATURE: 97 F | OXYGEN SATURATION: 98 %

## 2020-08-24 DIAGNOSIS — M85.80 OSTEOPENIA, UNSPECIFIED LOCATION: ICD-10-CM

## 2020-08-24 DIAGNOSIS — I10 HYPERTENSION, ESSENTIAL: ICD-10-CM

## 2020-08-24 DIAGNOSIS — Z00.00 MEDICARE ANNUAL WELLNESS VISIT, SUBSEQUENT: Primary | ICD-10-CM

## 2020-08-24 DIAGNOSIS — E78.00 HYPERCHOLESTEROLEMIA: ICD-10-CM

## 2020-08-24 DIAGNOSIS — L65.9 ALOPECIA: ICD-10-CM

## 2020-08-24 DIAGNOSIS — L29.9 PRURITUS: ICD-10-CM

## 2020-08-24 DIAGNOSIS — E03.9 ACQUIRED HYPOTHYROIDISM: ICD-10-CM

## 2020-08-24 LAB
ALBUMIN SERPL-MCNC: 3.7 G/DL (ref 3.4–5)
ALBUMIN/GLOB SERPL: 1 {RATIO} (ref 1–2)
ALP LIVER SERPL-CCNC: 74 U/L (ref 55–142)
ALT SERPL-CCNC: 20 U/L (ref 13–56)
ANION GAP SERPL CALC-SCNC: 6 MMOL/L (ref 0–18)
AST SERPL-CCNC: 15 U/L (ref 15–37)
BACTERIA UR QL AUTO: NEGATIVE /HPF
BASOPHILS # BLD AUTO: 0.07 X10(3) UL (ref 0–0.2)
BASOPHILS NFR BLD AUTO: 0.9 %
BILIRUB SERPL-MCNC: 1.2 MG/DL (ref 0.1–2)
BILIRUB UR QL: NEGATIVE
BUN BLD-MCNC: 17 MG/DL (ref 7–18)
BUN/CREAT SERPL: 14.5 (ref 10–20)
CALCIUM BLD-MCNC: 9.3 MG/DL (ref 8.5–10.1)
CHLORIDE SERPL-SCNC: 108 MMOL/L (ref 98–112)
CHOLEST SMN-MCNC: 142 MG/DL (ref ?–200)
CLARITY UR: CLEAR
CO2 SERPL-SCNC: 28 MMOL/L (ref 21–32)
COLOR UR: YELLOW
CREAT BLD-MCNC: 1.17 MG/DL (ref 0.55–1.02)
DEPRECATED HBV CORE AB SER IA-ACNC: 128.5 NG/ML (ref 18–340)
DEPRECATED RDW RBC AUTO: 40.7 FL (ref 35.1–46.3)
EOSINOPHIL # BLD AUTO: 0.31 X10(3) UL (ref 0–0.7)
EOSINOPHIL NFR BLD AUTO: 4.1 %
ERYTHROCYTE [DISTWIDTH] IN BLOOD BY AUTOMATED COUNT: 12.9 % (ref 11–15)
GLOBULIN PLAS-MCNC: 3.6 G/DL (ref 2.8–4.4)
GLUCOSE BLD-MCNC: 94 MG/DL (ref 70–99)
GLUCOSE UR-MCNC: NEGATIVE MG/DL
HCT VFR BLD AUTO: 37 % (ref 35–48)
HDLC SERPL-MCNC: 53 MG/DL (ref 40–59)
HGB BLD-MCNC: 12.5 G/DL (ref 12–16)
IMM GRANULOCYTES # BLD AUTO: 0.04 X10(3) UL (ref 0–1)
IMM GRANULOCYTES NFR BLD: 0.5 %
KETONES UR-MCNC: NEGATIVE MG/DL
LDLC SERPL CALC-MCNC: 65 MG/DL (ref ?–100)
LYMPHOCYTES # BLD AUTO: 2.04 X10(3) UL (ref 1–4)
LYMPHOCYTES NFR BLD AUTO: 27.2 %
M PROTEIN MFR SERPL ELPH: 7.3 G/DL (ref 6.4–8.2)
MCH RBC QN AUTO: 29.7 PG (ref 26–34)
MCHC RBC AUTO-ENTMCNC: 33.8 G/DL (ref 31–37)
MCV RBC AUTO: 87.9 FL (ref 80–100)
MONOCYTES # BLD AUTO: 0.76 X10(3) UL (ref 0.1–1)
MONOCYTES NFR BLD AUTO: 10.1 %
NEUTROPHILS # BLD AUTO: 4.27 X10 (3) UL (ref 1.5–7.7)
NEUTROPHILS # BLD AUTO: 4.27 X10(3) UL (ref 1.5–7.7)
NEUTROPHILS NFR BLD AUTO: 57.2 %
NITRITE UR QL STRIP.AUTO: NEGATIVE
NONHDLC SERPL-MCNC: 89 MG/DL (ref ?–130)
OSMOLALITY SERPL CALC.SUM OF ELEC: 295 MOSM/KG (ref 275–295)
PATIENT FASTING Y/N/NP: YES
PATIENT FASTING Y/N/NP: YES
PH UR: 7 [PH] (ref 5–8)
PLATELET # BLD AUTO: 230 10(3)UL (ref 150–450)
POTASSIUM SERPL-SCNC: 4.1 MMOL/L (ref 3.5–5.1)
PROT UR-MCNC: NEGATIVE MG/DL
RBC # BLD AUTO: 4.21 X10(6)UL (ref 3.8–5.3)
RBC #/AREA URNS AUTO: 2 /HPF
SODIUM SERPL-SCNC: 142 MMOL/L (ref 136–145)
SP GR UR STRIP: 1.01 (ref 1–1.03)
TRIGL SERPL-MCNC: 119 MG/DL (ref 30–149)
TSI SER-ACNC: 1.29 MIU/ML (ref 0.36–3.74)
UROBILINOGEN UR STRIP-ACNC: <2
VLDLC SERPL CALC-MCNC: 24 MG/DL (ref 0–30)
WBC # BLD AUTO: 7.5 X10(3) UL (ref 4–11)
WBC #/AREA URNS AUTO: 8 /HPF

## 2020-08-24 PROCEDURE — 82306 VITAMIN D 25 HYDROXY: CPT

## 2020-08-24 PROCEDURE — 84443 ASSAY THYROID STIM HORMONE: CPT

## 2020-08-24 PROCEDURE — 99214 OFFICE O/P EST MOD 30 MIN: CPT | Performed by: INTERNAL MEDICINE

## 2020-08-24 PROCEDURE — 90715 TDAP VACCINE 7 YRS/> IM: CPT | Performed by: INTERNAL MEDICINE

## 2020-08-24 PROCEDURE — G0439 PPPS, SUBSEQ VISIT: HCPCS | Performed by: INTERNAL MEDICINE

## 2020-08-24 PROCEDURE — 82728 ASSAY OF FERRITIN: CPT

## 2020-08-24 PROCEDURE — 85025 COMPLETE CBC W/AUTO DIFF WBC: CPT

## 2020-08-24 PROCEDURE — 81001 URINALYSIS AUTO W/SCOPE: CPT | Performed by: INTERNAL MEDICINE

## 2020-08-24 PROCEDURE — G0463 HOSPITAL OUTPT CLINIC VISIT: HCPCS | Performed by: INTERNAL MEDICINE

## 2020-08-24 PROCEDURE — 80053 COMPREHEN METABOLIC PANEL: CPT

## 2020-08-24 PROCEDURE — 80061 LIPID PANEL: CPT

## 2020-08-24 PROCEDURE — 36415 COLL VENOUS BLD VENIPUNCTURE: CPT

## 2020-08-24 PROCEDURE — 87086 URINE CULTURE/COLONY COUNT: CPT | Performed by: INTERNAL MEDICINE

## 2020-08-24 PROCEDURE — 90471 IMMUNIZATION ADMIN: CPT | Performed by: INTERNAL MEDICINE

## 2020-08-24 RX ORDER — CHOLECALCIFEROL (VITAMIN D3) 125 MCG
1 CAPSULE ORAL DAILY
COMMUNITY
End: 2021-08-11

## 2020-08-24 NOTE — PROGRESS NOTES
Alirio Doherty is a 75year old female who presents for     Medicare annual wellness.     Feels good. UTI of 7/29/20 visit --sx resolved with keflex. 7/29/20–UA 26 WBC 4 RBC. Ucx--10–50 K E. coli, pansensitive.       Saw Dr Leslie Mclain 8/14/20 for hair loss [place]   • ELECTROCARDIOGRAM, COMPLETE  2012    Scanned to media tab - DOS 2012   • EYE SURGERY     •         Family History   Problem Relation Age of Onset   • Heart Disease Father          age 93--\"old age\"   • Heart Disease Mo adenopathy  Abdomen: soft, nontender without mass or hepatosplenomegaly  Rectal--exam is done per gyn  Extremities: no edema  Neurologic: alert and oriented      ASSESSMENT AND PLAN:     Medicare annual wellness     Hypertension -on Losartan 100 mg daily, and greater than 50% of the time was spent counseling the patient and/or coordinating care. - CBC WITH DIFFERENTIAL WITH PLATELET; Future  - COMP METABOLIC PANEL (14); Future  - LIPID PANEL;  Future  - TSH W REFLEX TO FREE T4; Future  - URINALYSIS WI Yes    Hearing Problems?: No     Functional Status     Hearing Problems?: No    Vision Problems? : No    Difficulty walking?: No    Difficulty dressing or bathing?: No    Problems with daily activities? : No    Memory Problems?: No      Fall/Risk Assessmen at a meeting or place of [employer]:  No   Many people I talk to seem to mumble (or don't speak clearly):  No People get annoyed because I misunderstand what they say:  No   I misunderstand what others are saying and make inappropriate responses:  No I avoid

## 2020-08-25 ENCOUNTER — TELEPHONE (OUTPATIENT)
Dept: INTERNAL MEDICINE CLINIC | Facility: CLINIC | Age: 75
End: 2020-08-25

## 2020-08-25 NOTE — TELEPHONE ENCOUNTER
8/24/20–cbc cmp tsh lipid ua Vit d level --creat 1.17 GFR 46 (stable) UA 8 WBC. Ucx, vitamin D pending.

## 2020-08-26 LAB — 25(OH)D3 SERPL-MCNC: 32.2 NG/ML (ref 30–100)

## 2020-09-02 NOTE — TELEPHONE ENCOUNTER
To nursing, please tell patient lab test results are okay. Results include normal cholesterol levels, normal vitamin D, normal thyroid level. The urine culture shows the urinary tract infection has cleared. Thanks.     Note to self–  8/24/20–cbc cmp tsh

## 2020-09-18 ENCOUNTER — LAB ENCOUNTER (OUTPATIENT)
Dept: LAB | Age: 75
End: 2020-09-18
Attending: INTERNAL MEDICINE
Payer: MEDICARE

## 2020-09-18 ENCOUNTER — TELEPHONE (OUTPATIENT)
Dept: INTERNAL MEDICINE CLINIC | Facility: CLINIC | Age: 75
End: 2020-09-18

## 2020-09-18 DIAGNOSIS — R39.9 UTI SYMPTOMS: ICD-10-CM

## 2020-09-18 DIAGNOSIS — R39.9 UTI SYMPTOMS: Primary | ICD-10-CM

## 2020-09-18 LAB
BACTERIA UR QL AUTO: NEGATIVE /HPF
BILIRUB UR QL: NEGATIVE
CLARITY UR: CLEAR
COLOR UR: COLORLESS
GLUCOSE UR-MCNC: NEGATIVE MG/DL
KETONES UR-MCNC: NEGATIVE MG/DL
NITRITE UR QL STRIP.AUTO: NEGATIVE
PH UR: 7 [PH] (ref 5–8)
PROT UR-MCNC: NEGATIVE MG/DL
RBC #/AREA URNS AUTO: <1 /HPF
SP GR UR STRIP: 1 (ref 1–1.03)
UROBILINOGEN UR STRIP-ACNC: <2
WBC #/AREA URNS AUTO: 31 /HPF

## 2020-09-18 PROCEDURE — 87086 URINE CULTURE/COLONY COUNT: CPT

## 2020-09-18 PROCEDURE — 81001 URINALYSIS AUTO W/SCOPE: CPT

## 2020-09-18 RX ORDER — CEPHALEXIN 500 MG/1
500 CAPSULE ORAL 2 TIMES DAILY
Qty: 14 CAPSULE | Refills: 0 | Status: SHIPPED | OUTPATIENT
Start: 2020-09-18 | End: 2021-05-12

## 2020-09-18 NOTE — TELEPHONE ENCOUNTER
Please call pt, feels that she has a bladder infection  Pt has burning during night, woke her up, started yesterday  Pt has had before and this is what if feels like  [facility]  Tasked to nursing

## 2020-09-18 NOTE — TELEPHONE ENCOUNTER
Pt confirmed below documented information. Denies fever, low back/ flank pain. Dysuria, urinary frequency and urgency started yesterday. This RN informed her that this will be reviewed with Dr. Dr. Olga Boss and she will be called back with instructions.

## 2020-09-18 NOTE — TELEPHONE ENCOUNTER
To nursing, please tell patient I received her message of UTI symptoms. Orders entered for UA/urine culture. Rx sent to [facility] for cephalexin 500 mg twice daily #14. Thanks.     1. UTI symptoms  - URINALYSIS, ROUTINE; Future  - URINE CULTURE, ROUT

## 2020-09-20 ENCOUNTER — TELEPHONE (OUTPATIENT)
Dept: INTERNAL MEDICINE CLINIC | Facility: CLINIC | Age: 75
End: 2020-09-20

## 2020-09-21 NOTE — TELEPHONE ENCOUNTER
To nursing--please tell pt urinalysis is consistent with a UTI--although the urine culture is negative. If better on the cephalexin prescibed 9/18/20--ok to finish it. If not better, please let us know. Thanks. Note to self-  9/18/20-UA--31 wbc.  Uc

## 2020-09-21 NOTE — TELEPHONE ENCOUNTER
Patient called and spoke with  Manisha Mathur and relayed Dr Colleen Llamas message. Oniel verbalized understanding and will relay message to patient as well. Patient to call back office for any further questions or symptoms do not improve.

## 2020-09-28 RX ORDER — ATORVASTATIN CALCIUM 10 MG/1
TABLET, FILM COATED ORAL
Qty: 90 TABLET | Refills: 3 | Status: SHIPPED | OUTPATIENT
Start: 2020-09-28 | End: 2021-08-11

## 2020-11-02 RX ORDER — LOSARTAN POTASSIUM 100 MG/1
TABLET ORAL
Qty: 90 TABLET | Refills: 3 | Status: SHIPPED | OUTPATIENT
Start: 2020-11-02 | End: 2021-08-11

## 2020-11-02 RX ORDER — LEVOTHYROXINE SODIUM 88 UG/1
TABLET ORAL
Qty: 90 TABLET | Refills: 3 | Status: SHIPPED | OUTPATIENT
Start: 2020-11-02 | End: 2021-08-11

## 2020-11-02 RX ORDER — HYDROCHLOROTHIAZIDE 12.5 MG/1
TABLET ORAL
Qty: 90 TABLET | Refills: 3 | Status: SHIPPED | OUTPATIENT
Start: 2020-11-02 | End: 2021-08-11

## 2020-11-19 ENCOUNTER — LAB ENCOUNTER (OUTPATIENT)
Dept: LAB | Age: 75
End: 2020-11-19
Attending: INTERNAL MEDICINE
Payer: MEDICARE

## 2020-11-19 ENCOUNTER — TELEPHONE (OUTPATIENT)
Dept: INTERNAL MEDICINE CLINIC | Facility: CLINIC | Age: 75
End: 2020-11-19

## 2020-11-19 DIAGNOSIS — R30.9 PAINFUL URINATION: ICD-10-CM

## 2020-11-19 DIAGNOSIS — R35.0 URINARY FREQUENCY: ICD-10-CM

## 2020-11-19 DIAGNOSIS — R35.0 URINARY FREQUENCY: Primary | ICD-10-CM

## 2020-11-19 PROCEDURE — 87086 URINE CULTURE/COLONY COUNT: CPT

## 2020-11-19 PROCEDURE — 81001 URINALYSIS AUTO W/SCOPE: CPT

## 2020-11-19 RX ORDER — CEPHALEXIN 500 MG/1
500 CAPSULE ORAL 2 TIMES DAILY
Qty: 14 CAPSULE | Refills: 0 | Status: SHIPPED | OUTPATIENT
Start: 2020-11-19 | End: 2021-05-12

## 2020-11-19 NOTE — TELEPHONE ENCOUNTER
To Dr. Radha lechuga -  Dr. Les Campoverde' message relayed to pt's , per HIPAA, who verbalized understanding.   Pt's  stated pt was dropping off urine sample and suggested I call her on cell: [phone] which was done, no answer/message left fo

## 2020-11-19 NOTE — TELEPHONE ENCOUNTER
To Dr. Neville Méndez----    Are you able to assist in MD absence?      UTI symptoms:    [x]Frequency  []Urgency  [x]Pain/burning  []Blood in urine  []Low back pain  []Flank pain  []Fevers/chills  []Odor  []Confusion    NOTES:    Pt reports symptoms started last night a

## 2020-11-19 NOTE — TELEPHONE ENCOUNTER
Pt. Called stating she woke up 3:00 a.m. with burning when she urinates. Frequency, sometimes urinates just a dribble and other times she feels she has a full bladder. No fever, chills, or blood. Pt. Can be reached at [phone].

## 2020-11-20 ENCOUNTER — HOSPITAL ENCOUNTER (OUTPATIENT)
Dept: MAMMOGRAPHY | Facility: HOSPITAL | Age: 75
Discharge: HOME | End: 2020-11-20
Attending: OBSTETRICS & GYNECOLOGY
Payer: MEDICARE

## 2020-11-20 DIAGNOSIS — Z12.31 ENCOUNTER FOR SCREENING MAMMOGRAM FOR MALIGNANT NEOPLASM OF BREAST: ICD-10-CM

## 2020-11-20 PROCEDURE — 77063 BREAST TOMOSYNTHESIS BI: CPT | Performed by: OBSTETRICS & GYNECOLOGY

## 2020-11-20 PROCEDURE — 77067 SCR MAMMO BI INCL CAD: CPT | Performed by: OBSTETRICS & GYNECOLOGY

## 2020-11-20 NOTE — TELEPHONE ENCOUNTER
I note 11/19/20–urinalysis 64 WBCs. Urine culture pending--->later Ucx returned with neg results. Danish Hay

## 2020-11-23 NOTE — TELEPHONE ENCOUNTER
I spoke with patient and relayed Dr. Garrett Dunbar message. She verbalized understanding. She is feeling better. She will complete the antibiotics. To Dr. Pablo Jean.

## 2020-11-23 NOTE — TELEPHONE ENCOUNTER
To nursing, please tell pt Urinalysis results consistent with UTI although Ucx neg. If better with the cephalexin 500 mg bid #14 that was prescribed on Thursday 11/19/20--ok to complete course. If not better, let us know. Thanks.

## 2021-03-09 DIAGNOSIS — Z23 NEED FOR VACCINATION: ICD-10-CM

## 2021-05-12 ENCOUNTER — LAB ENCOUNTER (OUTPATIENT)
Dept: LAB | Age: 76
End: 2021-05-12
Attending: INTERNAL MEDICINE
Payer: MEDICARE

## 2021-05-12 ENCOUNTER — OFFICE VISIT (OUTPATIENT)
Dept: INTERNAL MEDICINE CLINIC | Facility: CLINIC | Age: 76
End: 2021-05-12
Payer: MEDICARE

## 2021-05-12 VITALS
TEMPERATURE: 98 F | SYSTOLIC BLOOD PRESSURE: 138 MMHG | HEART RATE: 60 BPM | WEIGHT: 163 LBS | HEIGHT: 61 IN | BODY MASS INDEX: 30.78 KG/M2 | DIASTOLIC BLOOD PRESSURE: 84 MMHG

## 2021-05-12 DIAGNOSIS — N39.0 ACUTE UTI: Primary | ICD-10-CM

## 2021-05-12 DIAGNOSIS — N39.0 ACUTE UTI: ICD-10-CM

## 2021-05-12 PROCEDURE — 99213 OFFICE O/P EST LOW 20 MIN: CPT | Performed by: INTERNAL MEDICINE

## 2021-05-12 PROCEDURE — 87086 URINE CULTURE/COLONY COUNT: CPT

## 2021-05-12 PROCEDURE — 81001 URINALYSIS AUTO W/SCOPE: CPT

## 2021-05-12 RX ORDER — CEPHALEXIN 500 MG/1
500 CAPSULE ORAL 2 TIMES DAILY
Qty: 14 CAPSULE | Refills: 0 | Status: SHIPPED | OUTPATIENT
Start: 2021-05-12 | End: 2021-08-11

## 2021-05-12 NOTE — PROGRESS NOTES
Pushpa Ann is a 67year old female who presents with     urinary symptoms. Onset: 2 wks ago, noted a \"slight burning with urination. \"  Off and on symptoms for 2 wks. Comes and goes in intensity of burning w urinating.    Symptoms: dysuria, frequen Allergies    EXAM:   /84   Pulse 60   Temp 98 °F (36.7 °C) (Oral)   Ht 5' 1\" (1.549 m)   Wt 163 lb (73.9 kg)   BMI 30.80 kg/m²     Wt Readings from Last 6 Encounters:  05/12/21 : 163 lb (73.9 kg)  08/24/20 : 159 lb 3.2 oz (72.2 kg)  07/29/20 : 159 l

## 2021-05-14 ENCOUNTER — TELEPHONE (OUTPATIENT)
Dept: INTERNAL MEDICINE CLINIC | Facility: CLINIC | Age: 76
End: 2021-05-14

## 2021-05-14 NOTE — TELEPHONE ENCOUNTER
To nursing, please tell patient   urinalysis is consistent with a urinary tract infection, although the urine culture is negative. She had a similar scenario in November 2020. If her UTI symptoms are improved with taking cephalexin, okay to finish it.

## 2021-05-14 NOTE — TELEPHONE ENCOUNTER
Spoke with patient to relay MD message below; Patient verbalized understanding and reports she is definitely feeling much better. Pt did not have any questions or concerns at this time, will complete abx for good measure.

## 2021-08-11 ENCOUNTER — OFFICE VISIT (OUTPATIENT)
Dept: INTERNAL MEDICINE CLINIC | Facility: CLINIC | Age: 76
End: 2021-08-11
Payer: MEDICARE

## 2021-08-11 VITALS
TEMPERATURE: 98 F | WEIGHT: 160 LBS | BODY MASS INDEX: 30.21 KG/M2 | DIASTOLIC BLOOD PRESSURE: 80 MMHG | HEIGHT: 61 IN | HEART RATE: 60 BPM | SYSTOLIC BLOOD PRESSURE: 124 MMHG

## 2021-08-11 DIAGNOSIS — E78.00 HYPERCHOLESTEROLEMIA: ICD-10-CM

## 2021-08-11 DIAGNOSIS — I10 HYPERTENSION, ESSENTIAL: ICD-10-CM

## 2021-08-11 DIAGNOSIS — Z00.00 MEDICARE ANNUAL WELLNESS VISIT, SUBSEQUENT: Primary | ICD-10-CM

## 2021-08-11 DIAGNOSIS — E03.9 ACQUIRED HYPOTHYROIDISM: ICD-10-CM

## 2021-08-11 PROCEDURE — G0439 PPPS, SUBSEQ VISIT: HCPCS | Performed by: INTERNAL MEDICINE

## 2021-08-11 RX ORDER — ESTRADIOL 0.1 MG/G
CREAM VAGINAL
Refills: 0 | COMMUNITY
Start: 2021-08-11

## 2021-08-11 RX ORDER — LEVOTHYROXINE SODIUM 88 UG/1
88 TABLET ORAL
Qty: 90 TABLET | Refills: 3 | Status: SHIPPED | OUTPATIENT
Start: 2021-08-11

## 2021-08-11 RX ORDER — ATORVASTATIN CALCIUM 10 MG/1
10 TABLET, FILM COATED ORAL NIGHTLY
Qty: 90 TABLET | Refills: 3 | Status: SHIPPED | OUTPATIENT
Start: 2021-08-11

## 2021-08-11 RX ORDER — HYDROCHLOROTHIAZIDE 12.5 MG/1
12.5 TABLET ORAL DAILY
Qty: 90 TABLET | Refills: 3 | Status: SHIPPED | OUTPATIENT
Start: 2021-08-11

## 2021-08-11 RX ORDER — LOSARTAN POTASSIUM 100 MG/1
100 TABLET ORAL DAILY
Qty: 90 TABLET | Refills: 3 | Status: SHIPPED | OUTPATIENT
Start: 2021-08-11

## 2021-08-11 NOTE — PATIENT INSTRUCTIONS
After holding biotin for 72 hours, do fasting lab tests. Work on diet and weight loss. See Dr Cristina Bryan for check of skin moles, tags.

## 2021-08-11 NOTE — PROGRESS NOTES
Keegan Shabazz is a 67year old female who presents for     Medicare annual wellness.     Feels good. UTI symptoms of 5/12/21 visit– took cephalexin. 5/12/21–UA 21–50 WBC.  Ucx neg.   1 wk after took cephalexin, still some sx, she went to her gyn, Dr Emelia Avila Heart Disease Father          age 93--\"old age\"   • Heart Disease Mother          age 67-suddenly    • Stroke Sister          age 48-\"stroke or aneurysm\"   • Ear Problems Brother         deafness   • Thyroid disease Sister         hypot    Hypothyroidism--on synthroid 88 mcg daily. TSH nl on 8/25/20. Akua Boss Check TSH. Hypercholesterolemia--on lipitor 10 mg daily. LDL 65 on 8/24/20.   Check lipids.      Hx Microhematuria--had eval 2009 and May/June 2012 Dr. Harman Covarrubias urogram and cysto DIFFERENTIAL WITH PLATELET; Future  - COMP METABOLIC PANEL (14); Future  - LIPID PANEL;  Future  - TSH W REFLEX TO FREE T4; Future      Current Outpatient Medications   Medication Sig Dispense Refill   • estradiol 0.1 MG/GM Vaginal Cream Apply twice a week dressing or bathing?: No    Problems with daily activities? : No    Memory Problems?: No      Fall/Risk Assessment          Have you fallen in the last 12 months?: 0-No            Depression Screening (PHQ-2/PHQ-9): Over the LAST 2 WEEKS      Radha godoy

## 2021-08-16 ENCOUNTER — LAB ENCOUNTER (OUTPATIENT)
Dept: LAB | Age: 76
End: 2021-08-16
Attending: INTERNAL MEDICINE
Payer: MEDICARE

## 2021-08-16 DIAGNOSIS — I10 HYPERTENSION, ESSENTIAL: ICD-10-CM

## 2021-08-16 DIAGNOSIS — E78.00 HYPERCHOLESTEROLEMIA: ICD-10-CM

## 2021-08-16 DIAGNOSIS — E03.9 ACQUIRED HYPOTHYROIDISM: ICD-10-CM

## 2021-08-16 LAB
ALBUMIN SERPL-MCNC: 3.9 G/DL (ref 3.4–5)
ALBUMIN/GLOB SERPL: 1.2 {RATIO} (ref 1–2)
ALP LIVER SERPL-CCNC: 77 U/L
ALT SERPL-CCNC: 22 U/L
ANION GAP SERPL CALC-SCNC: 5 MMOL/L (ref 0–18)
AST SERPL-CCNC: 17 U/L (ref 15–37)
BASOPHILS # BLD AUTO: 0.07 X10(3) UL (ref 0–0.2)
BASOPHILS NFR BLD AUTO: 0.9 %
BILIRUB SERPL-MCNC: 1.2 MG/DL (ref 0.1–2)
BUN BLD-MCNC: 17 MG/DL (ref 7–18)
BUN/CREAT SERPL: 15 (ref 10–20)
CALCIUM BLD-MCNC: 9.2 MG/DL (ref 8.5–10.1)
CHLORIDE SERPL-SCNC: 111 MMOL/L (ref 98–112)
CHOLEST SMN-MCNC: 137 MG/DL (ref ?–200)
CO2 SERPL-SCNC: 27 MMOL/L (ref 21–32)
CREAT BLD-MCNC: 1.13 MG/DL
DEPRECATED RDW RBC AUTO: 41.3 FL (ref 35.1–46.3)
EOSINOPHIL # BLD AUTO: 0.22 X10(3) UL (ref 0–0.7)
EOSINOPHIL NFR BLD AUTO: 2.9 %
ERYTHROCYTE [DISTWIDTH] IN BLOOD BY AUTOMATED COUNT: 12.8 % (ref 11–15)
GLOBULIN PLAS-MCNC: 3.2 G/DL (ref 2.8–4.4)
GLUCOSE BLD-MCNC: 99 MG/DL (ref 70–99)
HCT VFR BLD AUTO: 39.6 %
HDLC SERPL-MCNC: 59 MG/DL (ref 40–59)
HGB BLD-MCNC: 12.9 G/DL
IMM GRANULOCYTES # BLD AUTO: 0.04 X10(3) UL (ref 0–1)
IMM GRANULOCYTES NFR BLD: 0.5 %
LDLC SERPL CALC-MCNC: 62 MG/DL (ref ?–100)
LYMPHOCYTES # BLD AUTO: 2.42 X10(3) UL (ref 1–4)
LYMPHOCYTES NFR BLD AUTO: 31.5 %
M PROTEIN MFR SERPL ELPH: 7.1 G/DL (ref 6.4–8.2)
MCH RBC QN AUTO: 28.7 PG (ref 26–34)
MCHC RBC AUTO-ENTMCNC: 32.6 G/DL (ref 31–37)
MCV RBC AUTO: 88 FL
MONOCYTES # BLD AUTO: 0.79 X10(3) UL (ref 0.1–1)
MONOCYTES NFR BLD AUTO: 10.3 %
NEUTROPHILS # BLD AUTO: 4.15 X10 (3) UL (ref 1.5–7.7)
NEUTROPHILS # BLD AUTO: 4.15 X10(3) UL (ref 1.5–7.7)
NEUTROPHILS NFR BLD AUTO: 53.9 %
NONHDLC SERPL-MCNC: 78 MG/DL (ref ?–130)
OSMOLALITY SERPL CALC.SUM OF ELEC: 298 MOSM/KG (ref 275–295)
PATIENT FASTING Y/N/NP: YES
PATIENT FASTING Y/N/NP: YES
PLATELET # BLD AUTO: 264 10(3)UL (ref 150–450)
POTASSIUM SERPL-SCNC: 4.4 MMOL/L (ref 3.5–5.1)
RBC # BLD AUTO: 4.5 X10(6)UL
SODIUM SERPL-SCNC: 143 MMOL/L (ref 136–145)
TRIGL SERPL-MCNC: 84 MG/DL (ref 30–149)
TSI SER-ACNC: 1.67 MIU/ML (ref 0.36–3.74)
VLDLC SERPL CALC-MCNC: 12 MG/DL (ref 0–30)
WBC # BLD AUTO: 7.7 X10(3) UL (ref 4–11)

## 2021-08-16 PROCEDURE — 80061 LIPID PANEL: CPT

## 2021-08-16 PROCEDURE — 85025 COMPLETE CBC W/AUTO DIFF WBC: CPT

## 2021-08-16 PROCEDURE — 84443 ASSAY THYROID STIM HORMONE: CPT

## 2021-08-16 PROCEDURE — 36415 COLL VENOUS BLD VENIPUNCTURE: CPT

## 2021-08-16 PROCEDURE — 80053 COMPREHEN METABOLIC PANEL: CPT

## 2021-08-17 ENCOUNTER — TELEPHONE (OUTPATIENT)
Dept: INTERNAL MEDICINE CLINIC | Facility: CLINIC | Age: 76
End: 2021-08-17

## 2021-08-17 NOTE — TELEPHONE ENCOUNTER
To nursing home please tell patient lab results are okay. Thanks.     8/16/21–CBC CMP TSH lipid --results okay

## 2021-11-16 RX ORDER — LOSARTAN POTASSIUM 100 MG/1
TABLET ORAL
Qty: 90 TABLET | Refills: 0 | OUTPATIENT
Start: 2021-11-16

## 2021-11-16 NOTE — TELEPHONE ENCOUNTER
Current refill request refused due to refill is either a duplicate request or has active refills at the pharmacy. Check previous templates.     Requested Prescriptions     Refused Prescriptions Disp Refills   • LOSARTAN 100 MG Oral Tab [Pharmacy Med Name:

## 2021-12-17 ENCOUNTER — HOSPITAL ENCOUNTER (OUTPATIENT)
Dept: MAMMOGRAPHY | Facility: HOSPITAL | Age: 76
Discharge: HOME | End: 2021-12-17
Attending: OBSTETRICS & GYNECOLOGY
Payer: MEDICARE

## 2021-12-17 ENCOUNTER — HOSPITAL ENCOUNTER (OUTPATIENT)
Dept: BONE DENSITY | Facility: HOSPITAL | Age: 76
Discharge: HOME | End: 2021-12-17
Attending: OBSTETRICS & GYNECOLOGY
Payer: MEDICARE

## 2021-12-17 DIAGNOSIS — Z12.31 ENCOUNTER FOR SCREENING MAMMOGRAM FOR MALIGNANT NEOPLASM OF BREAST: ICD-10-CM

## 2021-12-17 DIAGNOSIS — Z13.820 ENCOUNTER FOR SCREENING FOR OSTEOPOROSIS: ICD-10-CM

## 2021-12-17 PROCEDURE — 77067 SCR MAMMO BI INCL CAD: CPT | Performed by: OBSTETRICS & GYNECOLOGY

## 2021-12-17 PROCEDURE — 77080 DXA BONE DENSITY AXIAL: CPT | Performed by: OBSTETRICS & GYNECOLOGY

## 2021-12-17 PROCEDURE — 77063 BREAST TOMOSYNTHESIS BI: CPT | Performed by: OBSTETRICS & GYNECOLOGY

## 2022-07-25 NOTE — TELEPHONE ENCOUNTER
General  Route to N    Reason for call: Shawn called to speak to Marivel S about them moving to [facility] one week from Friday. Writer advised they will still be able to have their virtual appointment with Dr. Jordan in August    Call back needed? Yes  Return Call Needed  Same as documented in contacts section  When to return call?: Greater than one day: Route standard priority   Recommend Keflex 500mg BID x 7 days after giving urine sample

## 2022-10-26 RX ORDER — ATORVASTATIN CALCIUM 10 MG/1
TABLET, FILM COATED ORAL
Qty: 90 TABLET | Refills: 0 | Status: SHIPPED | OUTPATIENT
Start: 2022-10-26 | End: 2022-10-31

## 2022-10-31 ENCOUNTER — OFFICE VISIT (OUTPATIENT)
Dept: INTERNAL MEDICINE CLINIC | Facility: CLINIC | Age: 77
End: 2022-10-31
Payer: MEDICARE

## 2022-10-31 VITALS
TEMPERATURE: 98 F | WEIGHT: 155.63 LBS | BODY MASS INDEX: 29.76 KG/M2 | OXYGEN SATURATION: 98 % | HEART RATE: 58 BPM | SYSTOLIC BLOOD PRESSURE: 148 MMHG | DIASTOLIC BLOOD PRESSURE: 80 MMHG | HEIGHT: 60.8 IN

## 2022-10-31 DIAGNOSIS — Z00.00 MEDICARE ANNUAL WELLNESS VISIT, SUBSEQUENT: Primary | ICD-10-CM

## 2022-10-31 DIAGNOSIS — I10 HYPERTENSION, ESSENTIAL: ICD-10-CM

## 2022-10-31 DIAGNOSIS — E78.00 HYPERCHOLESTEROLEMIA: ICD-10-CM

## 2022-10-31 DIAGNOSIS — E03.9 ACQUIRED HYPOTHYROIDISM: ICD-10-CM

## 2022-10-31 RX ORDER — LEVOTHYROXINE SODIUM 88 UG/1
88 TABLET ORAL
Qty: 90 TABLET | Refills: 3 | Status: SHIPPED | OUTPATIENT
Start: 2022-10-31

## 2022-10-31 RX ORDER — LOSARTAN POTASSIUM 100 MG/1
100 TABLET ORAL DAILY
Qty: 90 TABLET | Refills: 3 | Status: SHIPPED | OUTPATIENT
Start: 2022-10-31

## 2022-10-31 RX ORDER — HYDROCHLOROTHIAZIDE 12.5 MG/1
12.5 TABLET ORAL DAILY
Qty: 90 TABLET | Refills: 3 | Status: CANCELLED | OUTPATIENT
Start: 2022-10-31

## 2022-10-31 RX ORDER — LEVOTHYROXINE SODIUM 88 UG/1
88 TABLET ORAL
Qty: 90 TABLET | Refills: 3 | Status: CANCELLED | OUTPATIENT
Start: 2022-10-31

## 2022-10-31 RX ORDER — ATORVASTATIN CALCIUM 10 MG/1
10 TABLET, FILM COATED ORAL NIGHTLY
Qty: 90 TABLET | Refills: 3 | Status: SHIPPED | OUTPATIENT
Start: 2022-10-31

## 2022-10-31 RX ORDER — HYDROCHLOROTHIAZIDE 25 MG/1
25 TABLET ORAL DAILY
Qty: 90 TABLET | Refills: 3 | Status: SHIPPED | OUTPATIENT
Start: 2022-10-31

## 2022-11-09 RX ORDER — LEVOTHYROXINE SODIUM 88 UG/1
88 TABLET ORAL DAILY
COMMUNITY

## 2022-11-09 RX ORDER — HYDROCHLOROTHIAZIDE 12.5 MG/1
25 TABLET ORAL DAILY
COMMUNITY

## 2022-11-09 RX ORDER — LOSARTAN POTASSIUM 100 MG/1
100 TABLET ORAL DAILY
COMMUNITY

## 2022-11-09 RX ORDER — ESTRADIOL 0.1 MG/G
2 CREAM VAGINAL DAILY
COMMUNITY
End: 2022-11-15 | Stop reason: SDUPTHER

## 2022-11-09 RX ORDER — ATORVASTATIN CALCIUM 10 MG/1
10 TABLET, FILM COATED ORAL DAILY
COMMUNITY

## 2022-11-15 ENCOUNTER — OFFICE VISIT (OUTPATIENT)
Dept: OBGYN | Age: 77
End: 2022-11-15

## 2022-11-15 VITALS
HEIGHT: 61 IN | SYSTOLIC BLOOD PRESSURE: 150 MMHG | BODY MASS INDEX: 29.45 KG/M2 | TEMPERATURE: 98.1 F | WEIGHT: 156 LBS | DIASTOLIC BLOOD PRESSURE: 84 MMHG

## 2022-11-15 DIAGNOSIS — N95.2 ATROPHIC VAGINITIS: ICD-10-CM

## 2022-11-15 DIAGNOSIS — Z12.31 ENCOUNTER FOR SCREENING MAMMOGRAM FOR MALIGNANT NEOPLASM OF BREAST: Primary | ICD-10-CM

## 2022-11-15 PROCEDURE — G0101 CA SCREEN;PELVIC/BREAST EXAM: HCPCS | Performed by: OBSTETRICS & GYNECOLOGY

## 2022-11-15 RX ORDER — ESTRADIOL 0.1 MG/G
1 CREAM VAGINAL
Qty: 42.5 G | Refills: 1 | Status: SHIPPED | OUTPATIENT
Start: 2022-11-17

## 2022-11-15 ASSESSMENT — PATIENT HEALTH QUESTIONNAIRE - PHQ9
SUM OF ALL RESPONSES TO PHQ9 QUESTIONS 1 AND 2: 0
1. LITTLE INTEREST OR PLEASURE IN DOING THINGS: NOT AT ALL
2. FEELING DOWN, DEPRESSED OR HOPELESS: NOT AT ALL
SUM OF ALL RESPONSES TO PHQ9 QUESTIONS 1 AND 2: 0
CLINICAL INTERPRETATION OF PHQ2 SCORE: NO FURTHER SCREENING NEEDED

## 2022-11-22 RX ORDER — LOSARTAN POTASSIUM 100 MG/1
TABLET ORAL
Qty: 90 TABLET | Refills: 0 | OUTPATIENT
Start: 2022-11-22

## 2022-12-01 ENCOUNTER — LAB ENCOUNTER (OUTPATIENT)
Dept: LAB | Age: 77
End: 2022-12-01
Attending: INTERNAL MEDICINE
Payer: MEDICARE

## 2022-12-01 DIAGNOSIS — I10 HYPERTENSION, ESSENTIAL: ICD-10-CM

## 2022-12-01 LAB
ALBUMIN SERPL-MCNC: 4 G/DL (ref 3.4–5)
ALBUMIN/GLOB SERPL: 1.1 {RATIO} (ref 1–2)
ALP LIVER SERPL-CCNC: 81 U/L
ALT SERPL-CCNC: 19 U/L
ANION GAP SERPL CALC-SCNC: 5 MMOL/L (ref 0–18)
AST SERPL-CCNC: 17 U/L (ref 15–37)
BASOPHILS # BLD AUTO: 0.07 X10(3) UL (ref 0–0.2)
BASOPHILS NFR BLD AUTO: 0.9 %
BILIRUB SERPL-MCNC: 1.5 MG/DL (ref 0.1–2)
BILIRUB UR QL: NEGATIVE
BUN BLD-MCNC: 23 MG/DL (ref 7–18)
BUN/CREAT SERPL: 18.4 (ref 10–20)
CALCIUM BLD-MCNC: 9.5 MG/DL (ref 8.5–10.1)
CHLORIDE SERPL-SCNC: 106 MMOL/L (ref 98–112)
CHOLEST SERPL-MCNC: 145 MG/DL (ref ?–200)
CO2 SERPL-SCNC: 28 MMOL/L (ref 21–32)
COLOR UR: YELLOW
CREAT BLD-MCNC: 1.25 MG/DL
DEPRECATED RDW RBC AUTO: 40.8 FL (ref 35.1–46.3)
EOSINOPHIL # BLD AUTO: 0.19 X10(3) UL (ref 0–0.7)
EOSINOPHIL NFR BLD AUTO: 2.5 %
ERYTHROCYTE [DISTWIDTH] IN BLOOD BY AUTOMATED COUNT: 12.8 % (ref 11–15)
FASTING PATIENT LIPID ANSWER: YES
FASTING STATUS PATIENT QL REPORTED: YES
GFR SERPLBLD BASED ON 1.73 SQ M-ARVRAT: 44 ML/MIN/1.73M2 (ref 60–?)
GLOBULIN PLAS-MCNC: 3.5 G/DL (ref 2.8–4.4)
GLUCOSE BLD-MCNC: 103 MG/DL (ref 70–99)
GLUCOSE UR-MCNC: NEGATIVE MG/DL
HCT VFR BLD AUTO: 41.8 %
HDLC SERPL-MCNC: 59 MG/DL (ref 40–59)
HGB BLD-MCNC: 13.8 G/DL
IMM GRANULOCYTES # BLD AUTO: 0.05 X10(3) UL (ref 0–1)
IMM GRANULOCYTES NFR BLD: 0.7 %
KETONES UR-MCNC: NEGATIVE MG/DL
LDLC SERPL CALC-MCNC: 68 MG/DL (ref ?–100)
LYMPHOCYTES # BLD AUTO: 2.4 X10(3) UL (ref 1–4)
LYMPHOCYTES NFR BLD AUTO: 31.5 %
MCH RBC QN AUTO: 28.8 PG (ref 26–34)
MCHC RBC AUTO-ENTMCNC: 33 G/DL (ref 31–37)
MCV RBC AUTO: 87.1 FL
MONOCYTES # BLD AUTO: 0.89 X10(3) UL (ref 0.1–1)
MONOCYTES NFR BLD AUTO: 11.7 %
NEUTROPHILS # BLD AUTO: 4.02 X10 (3) UL (ref 1.5–7.7)
NEUTROPHILS # BLD AUTO: 4.02 X10(3) UL (ref 1.5–7.7)
NEUTROPHILS NFR BLD AUTO: 52.7 %
NITRITE UR QL STRIP.AUTO: POSITIVE
NONHDLC SERPL-MCNC: 86 MG/DL (ref ?–130)
OSMOLALITY SERPL CALC.SUM OF ELEC: 292 MOSM/KG (ref 275–295)
PH UR: 7 [PH] (ref 5–8)
PLATELET # BLD AUTO: 272 10(3)UL (ref 150–450)
POTASSIUM SERPL-SCNC: 4 MMOL/L (ref 3.5–5.1)
PROT SERPL-MCNC: 7.5 G/DL (ref 6.4–8.2)
PROT UR-MCNC: NEGATIVE MG/DL
RBC # BLD AUTO: 4.8 X10(6)UL
SODIUM SERPL-SCNC: 139 MMOL/L (ref 136–145)
SP GR UR STRIP: 1.01 (ref 1–1.03)
TRIGL SERPL-MCNC: 100 MG/DL (ref 30–149)
TSI SER-ACNC: 2.31 MIU/ML (ref 0.36–3.74)
UROBILINOGEN UR STRIP-ACNC: 1
VLDLC SERPL CALC-MCNC: 15 MG/DL (ref 0–30)
WBC # BLD AUTO: 7.6 X10(3) UL (ref 4–11)

## 2022-12-01 PROCEDURE — 36415 COLL VENOUS BLD VENIPUNCTURE: CPT

## 2022-12-01 PROCEDURE — 87088 URINE BACTERIA CULTURE: CPT | Performed by: INTERNAL MEDICINE

## 2022-12-01 PROCEDURE — 85025 COMPLETE CBC W/AUTO DIFF WBC: CPT

## 2022-12-01 PROCEDURE — 81001 URINALYSIS AUTO W/SCOPE: CPT | Performed by: INTERNAL MEDICINE

## 2022-12-01 PROCEDURE — 80061 LIPID PANEL: CPT

## 2022-12-01 PROCEDURE — 87086 URINE CULTURE/COLONY COUNT: CPT | Performed by: INTERNAL MEDICINE

## 2022-12-01 PROCEDURE — 87186 SC STD MICRODIL/AGAR DIL: CPT | Performed by: INTERNAL MEDICINE

## 2022-12-01 PROCEDURE — 81015 MICROSCOPIC EXAM OF URINE: CPT | Performed by: INTERNAL MEDICINE

## 2022-12-01 PROCEDURE — 80053 COMPREHEN METABOLIC PANEL: CPT

## 2022-12-01 PROCEDURE — 84443 ASSAY THYROID STIM HORMONE: CPT

## 2022-12-02 ENCOUNTER — TELEPHONE (OUTPATIENT)
Dept: INTERNAL MEDICINE CLINIC | Facility: CLINIC | Age: 77
End: 2022-12-02

## 2022-12-02 NOTE — TELEPHONE ENCOUNTER
12/1/2022-CBC CMP TSH lipid UA/CX-GFR 44-stable, UA 21-50 WBC. CX pending. I note patient has seen results. Await urine culture.

## 2022-12-04 NOTE — TELEPHONE ENCOUNTER
To nursing, please tell pt lab ok except has a UTI. Rx pended for you to send to her pharmacy. Cephalexin 500 mg bid #10. No need for follow up urine studies --unless has sx that don't resolve--then call. Thanks. Ucx>100 K e coli. Pansens--rx cephalexin.

## 2022-12-05 RX ORDER — CEPHALEXIN 500 MG/1
500 CAPSULE ORAL 2 TIMES DAILY
Qty: 10 CAPSULE | Refills: 0 | Status: SHIPPED | OUTPATIENT
Start: 2022-12-05

## 2022-12-05 NOTE — TELEPHONE ENCOUNTER
Spoke to pt and relayed MD message and instructions. Pt verbalized understanding and agrees with plan. Pt states she is not currently having any symptoms. She will start antibiotics and call if any symptoms develop.

## 2023-01-24 RX ORDER — ATORVASTATIN CALCIUM 10 MG/1
10 TABLET, FILM COATED ORAL NIGHTLY
Qty: 90 TABLET | Refills: 3 | Status: SHIPPED | OUTPATIENT
Start: 2023-01-24

## 2023-01-24 NOTE — TELEPHONE ENCOUNTER
Patient is calling to request a refill on atorvastatin 10 MG Oral Tab. Patient was using [facility] but has since switched to [facility] in [place] on [address]. Please send refill to [facility].     Last Medicare 10/2022.

## 2023-10-09 ENCOUNTER — OFFICE VISIT (OUTPATIENT)
Dept: INTERNAL MEDICINE CLINIC | Facility: CLINIC | Age: 78
End: 2023-10-09

## 2023-10-09 VITALS
TEMPERATURE: 99 F | WEIGHT: 158 LBS | SYSTOLIC BLOOD PRESSURE: 126 MMHG | BODY MASS INDEX: 29.83 KG/M2 | HEART RATE: 74 BPM | DIASTOLIC BLOOD PRESSURE: 78 MMHG | HEIGHT: 61 IN

## 2023-10-09 DIAGNOSIS — Z00.00 MEDICARE ANNUAL WELLNESS VISIT, SUBSEQUENT: Primary | ICD-10-CM

## 2023-10-09 RX ORDER — LOSARTAN POTASSIUM 100 MG/1
100 TABLET ORAL DAILY
Qty: 90 TABLET | Refills: 3 | Status: SHIPPED | OUTPATIENT
Start: 2023-10-09

## 2023-10-09 RX ORDER — LEVOTHYROXINE SODIUM 88 UG/1
88 TABLET ORAL
Qty: 90 TABLET | Refills: 3 | Status: SHIPPED | OUTPATIENT
Start: 2023-10-09

## 2023-10-09 RX ORDER — HYDROCHLOROTHIAZIDE 25 MG/1
25 TABLET ORAL DAILY
Qty: 90 TABLET | Refills: 3 | Status: SHIPPED | OUTPATIENT
Start: 2023-10-09

## 2023-10-16 ENCOUNTER — LAB ENCOUNTER (OUTPATIENT)
Dept: LAB | Age: 78
End: 2023-10-16
Attending: INTERNAL MEDICINE

## 2023-10-16 DIAGNOSIS — Z00.00 MEDICARE ANNUAL WELLNESS VISIT, SUBSEQUENT: ICD-10-CM

## 2023-10-16 LAB
ALBUMIN SERPL-MCNC: 3.8 G/DL (ref 3.4–5)
ALBUMIN/GLOB SERPL: 1.1 {RATIO} (ref 1–2)
ALP LIVER SERPL-CCNC: 81 U/L
ALT SERPL-CCNC: 20 U/L
ANION GAP SERPL CALC-SCNC: 7 MMOL/L (ref 0–18)
AST SERPL-CCNC: 19 U/L (ref 15–37)
BASOPHILS # BLD AUTO: 0.08 X10(3) UL (ref 0–0.2)
BASOPHILS NFR BLD AUTO: 1 %
BILIRUB SERPL-MCNC: 1.2 MG/DL (ref 0.1–2)
BILIRUB UR QL: NEGATIVE
BUN BLD-MCNC: 21 MG/DL (ref 7–18)
BUN/CREAT SERPL: 17.6 (ref 10–20)
CALCIUM BLD-MCNC: 9.5 MG/DL (ref 8.5–10.1)
CHLORIDE SERPL-SCNC: 109 MMOL/L (ref 98–112)
CHOLEST SERPL-MCNC: 149 MG/DL (ref ?–200)
CLARITY UR: CLEAR
CO2 SERPL-SCNC: 27 MMOL/L (ref 21–32)
CREAT BLD-MCNC: 1.19 MG/DL
DEPRECATED RDW RBC AUTO: 40.4 FL (ref 35.1–46.3)
EGFRCR SERPLBLD CKD-EPI 2021: 47 ML/MIN/1.73M2 (ref 60–?)
EOSINOPHIL # BLD AUTO: 0.21 X10(3) UL (ref 0–0.7)
EOSINOPHIL NFR BLD AUTO: 2.7 %
ERYTHROCYTE [DISTWIDTH] IN BLOOD BY AUTOMATED COUNT: 12.9 % (ref 11–15)
FASTING PATIENT LIPID ANSWER: YES
FASTING STATUS PATIENT QL REPORTED: YES
GLOBULIN PLAS-MCNC: 3.6 G/DL (ref 2.8–4.4)
GLUCOSE BLD-MCNC: 109 MG/DL (ref 70–99)
GLUCOSE UR-MCNC: NORMAL MG/DL
HCT VFR BLD AUTO: 39.9 %
HDLC SERPL-MCNC: 64 MG/DL (ref 40–59)
HGB BLD-MCNC: 13.2 G/DL
IMM GRANULOCYTES # BLD AUTO: 0.06 X10(3) UL (ref 0–1)
IMM GRANULOCYTES NFR BLD: 0.8 %
KETONES UR-MCNC: NEGATIVE MG/DL
LDLC SERPL CALC-MCNC: 66 MG/DL (ref ?–100)
LEUKOCYTE ESTERASE UR QL STRIP.AUTO: 250
LYMPHOCYTES # BLD AUTO: 2.4 X10(3) UL (ref 1–4)
LYMPHOCYTES NFR BLD AUTO: 30.8 %
MCH RBC QN AUTO: 28.5 PG (ref 26–34)
MCHC RBC AUTO-ENTMCNC: 33.1 G/DL (ref 31–37)
MCV RBC AUTO: 86.2 FL
MONOCYTES # BLD AUTO: 0.75 X10(3) UL (ref 0.1–1)
MONOCYTES NFR BLD AUTO: 9.6 %
NEUTROPHILS # BLD AUTO: 4.28 X10 (3) UL (ref 1.5–7.7)
NEUTROPHILS # BLD AUTO: 4.28 X10(3) UL (ref 1.5–7.7)
NEUTROPHILS NFR BLD AUTO: 55.1 %
NITRITE UR QL STRIP.AUTO: NEGATIVE
NONHDLC SERPL-MCNC: 85 MG/DL (ref ?–130)
OSMOLALITY SERPL CALC.SUM OF ELEC: 300 MOSM/KG (ref 275–295)
PH UR: 6.5 [PH] (ref 5–8)
PLATELET # BLD AUTO: 282 10(3)UL (ref 150–450)
POTASSIUM SERPL-SCNC: 3.5 MMOL/L (ref 3.5–5.1)
PROT SERPL-MCNC: 7.4 G/DL (ref 6.4–8.2)
PROT UR-MCNC: NEGATIVE MG/DL
RBC # BLD AUTO: 4.63 X10(6)UL
SODIUM SERPL-SCNC: 143 MMOL/L (ref 136–145)
SP GR UR STRIP: 1.01 (ref 1–1.03)
TRIGL SERPL-MCNC: 103 MG/DL (ref 30–149)
TSI SER-ACNC: 2.45 MIU/ML (ref 0.36–3.74)
UROBILINOGEN UR STRIP-ACNC: NORMAL
VLDLC SERPL CALC-MCNC: 15 MG/DL (ref 0–30)
WBC # BLD AUTO: 7.8 X10(3) UL (ref 4–11)

## 2023-10-16 PROCEDURE — 80061 LIPID PANEL: CPT

## 2023-10-16 PROCEDURE — 85025 COMPLETE CBC W/AUTO DIFF WBC: CPT

## 2023-10-16 PROCEDURE — 80053 COMPREHEN METABOLIC PANEL: CPT

## 2023-10-16 PROCEDURE — 81001 URINALYSIS AUTO W/SCOPE: CPT

## 2023-10-16 PROCEDURE — 36415 COLL VENOUS BLD VENIPUNCTURE: CPT

## 2023-10-16 PROCEDURE — 87086 URINE CULTURE/COLONY COUNT: CPT

## 2023-10-16 PROCEDURE — 84443 ASSAY THYROID STIM HORMONE: CPT

## 2023-10-26 ENCOUNTER — TELEPHONE (OUTPATIENT)
Dept: INTERNAL MEDICINE CLINIC | Facility: CLINIC | Age: 78
End: 2023-10-26

## 2024-01-10 ENCOUNTER — APPOINTMENT (OUTPATIENT)
Dept: OBGYN | Age: 79
End: 2024-01-10

## 2024-01-10 VITALS
BODY MASS INDEX: 30.02 KG/M2 | SYSTOLIC BLOOD PRESSURE: 144 MMHG | HEART RATE: 73 BPM | HEIGHT: 61 IN | OXYGEN SATURATION: 96 % | WEIGHT: 159 LBS | DIASTOLIC BLOOD PRESSURE: 52 MMHG | TEMPERATURE: 98.2 F

## 2024-01-10 DIAGNOSIS — Z12.31 ENCOUNTER FOR SCREENING MAMMOGRAM FOR MALIGNANT NEOPLASM OF BREAST: ICD-10-CM

## 2024-01-10 DIAGNOSIS — M81.0 AGE-RELATED OSTEOPOROSIS WITHOUT CURRENT PATHOLOGICAL FRACTURE: ICD-10-CM

## 2024-01-10 DIAGNOSIS — Z01.419 ENCOUNTER FOR ROUTINE GYNECOLOGIC EXAMINATION IN MEDICARE PATIENT: ICD-10-CM

## 2024-01-10 DIAGNOSIS — N95.2 ATROPHIC VAGINITIS: Primary | ICD-10-CM

## 2024-01-10 PROCEDURE — G0101 CA SCREEN;PELVIC/BREAST EXAM: HCPCS | Performed by: OBSTETRICS & GYNECOLOGY

## 2024-01-10 RX ORDER — ESTRADIOL 0.1 MG/G
1 CREAM VAGINAL
Qty: 42.5 G | Refills: 1 | Status: SHIPPED | OUTPATIENT
Start: 2024-01-11

## 2024-01-16 ENCOUNTER — HOSPITAL ENCOUNTER (OUTPATIENT)
Dept: MAMMOGRAPHY | Facility: HOSPITAL | Age: 79
Discharge: HOME | End: 2024-01-16
Attending: OBSTETRICS & GYNECOLOGY
Payer: MEDICARE

## 2024-01-16 ENCOUNTER — HOSPITAL ENCOUNTER (OUTPATIENT)
Dept: BONE DENSITY | Facility: HOSPITAL | Age: 79
Discharge: HOME | End: 2024-01-16
Attending: OBSTETRICS & GYNECOLOGY
Payer: MEDICARE

## 2024-01-16 DIAGNOSIS — M81.0 AGE-RELATED OSTEOPOROSIS WITHOUT CURRENT PATHOLOGICAL FRACTURE: ICD-10-CM

## 2024-01-16 DIAGNOSIS — Z12.31 ENCOUNTER FOR MAMMOGRAM TO ESTABLISH BASELINE MAMMOGRAM: ICD-10-CM

## 2024-01-16 PROCEDURE — 77067 SCR MAMMO BI INCL CAD: CPT | Performed by: OBSTETRICS & GYNECOLOGY

## 2024-01-16 PROCEDURE — 77080 DXA BONE DENSITY AXIAL: CPT | Performed by: OBSTETRICS & GYNECOLOGY

## 2024-01-16 PROCEDURE — 77063 BREAST TOMOSYNTHESIS BI: CPT | Performed by: OBSTETRICS & GYNECOLOGY

## 2024-01-17 ENCOUNTER — TELEPHONE (OUTPATIENT)
Dept: OBGYN | Age: 79
End: 2024-01-17

## 2024-01-19 ENCOUNTER — TELEPHONE (OUTPATIENT)
Dept: OBGYN | Age: 79
End: 2024-01-19

## 2024-01-19 ENCOUNTER — HOSPITAL ENCOUNTER (OUTPATIENT)
Dept: MAMMOGRAPHY | Facility: HOSPITAL | Age: 79
Discharge: HOME | End: 2024-01-19
Attending: OBSTETRICS & GYNECOLOGY
Payer: MEDICARE

## 2024-01-19 ENCOUNTER — HOSPITAL ENCOUNTER (OUTPATIENT)
Dept: ULTRASOUND IMAGING | Facility: HOSPITAL | Age: 79
Discharge: HOME | End: 2024-01-19
Attending: OBSTETRICS & GYNECOLOGY
Payer: MEDICARE

## 2024-01-19 DIAGNOSIS — R92.8 ABNORMAL MAMMOGRAM: ICD-10-CM

## 2024-01-19 PROCEDURE — 76642 ULTRASOUND BREAST LIMITED: CPT | Performed by: OBSTETRICS & GYNECOLOGY

## 2024-01-19 PROCEDURE — 77061 BREAST TOMOSYNTHESIS UNI: CPT | Performed by: OBSTETRICS & GYNECOLOGY

## 2024-01-19 PROCEDURE — 77065 DX MAMMO INCL CAD UNI: CPT | Performed by: OBSTETRICS & GYNECOLOGY

## 2024-03-06 ENCOUNTER — APPOINTMENT (OUTPATIENT)
Dept: OBGYN | Age: 79
End: 2024-03-06

## 2024-03-06 ENCOUNTER — LAB SERVICES (OUTPATIENT)
Dept: OBGYN | Age: 79
End: 2024-03-06

## 2024-03-06 VITALS
SYSTOLIC BLOOD PRESSURE: 133 MMHG | HEART RATE: 63 BPM | OXYGEN SATURATION: 96 % | TEMPERATURE: 97.9 F | DIASTOLIC BLOOD PRESSURE: 68 MMHG

## 2024-03-06 DIAGNOSIS — M85.80 OSTEOPENIA, UNSPECIFIED LOCATION: ICD-10-CM

## 2024-03-06 DIAGNOSIS — M85.80 OSTEOPENIA, UNSPECIFIED LOCATION: Primary | ICD-10-CM

## 2024-03-06 RX ORDER — ALENDRONATE SODIUM 70 MG/1
70 TABLET ORAL
Qty: 12 TABLET | Refills: 3 | Status: SHIPPED | OUTPATIENT
Start: 2024-03-06

## 2024-03-06 ASSESSMENT — PATIENT HEALTH QUESTIONNAIRE - PHQ9
CLINICAL INTERPRETATION OF PHQ2 SCORE: NO FURTHER SCREENING NEEDED
SUM OF ALL RESPONSES TO PHQ9 QUESTIONS 1 AND 2: 0
SUM OF ALL RESPONSES TO PHQ9 QUESTIONS 1 AND 2: 0
1. LITTLE INTEREST OR PLEASURE IN DOING THINGS: NOT AT ALL
2. FEELING DOWN, DEPRESSED OR HOPELESS: NOT AT ALL

## 2024-03-07 LAB
25(OH)D3+25(OH)D2 SERPL-MCNC: 39.5 NG/ML (ref 30–100)
ALBUMIN SERPL-MCNC: 4 G/DL (ref 3.6–5.1)
ALBUMIN/GLOB SERPL: 1.4 {RATIO} (ref 1–2.4)
ALP SERPL-CCNC: 83 UNITS/L (ref 45–117)
ALT SERPL-CCNC: 18 UNITS/L
ANION GAP SERPL CALC-SCNC: 13 MMOL/L (ref 7–19)
AST SERPL-CCNC: 16 UNITS/L
BASOPHILS # BLD: 0.1 K/MCL (ref 0–0.3)
BASOPHILS NFR BLD: 1 %
BILIRUB SERPL-MCNC: 1 MG/DL (ref 0.2–1)
BUN SERPL-MCNC: 18 MG/DL (ref 6–20)
BUN/CREAT SERPL: 18 (ref 7–25)
CALCIUM SERPL-MCNC: 9.2 MG/DL (ref 8.4–10.2)
CHLORIDE SERPL-SCNC: 107 MMOL/L (ref 97–110)
CO2 SERPL-SCNC: 26 MMOL/L (ref 21–32)
CREAT SERPL-MCNC: 0.98 MG/DL (ref 0.51–0.95)
DEPRECATED RDW RBC: 40.1 FL (ref 39–50)
EGFRCR SERPLBLD CKD-EPI 2021: 59 ML/MIN/{1.73_M2}
EOSINOPHIL # BLD: 0.3 K/MCL (ref 0–0.5)
EOSINOPHIL NFR BLD: 3 %
ERYTHROCYTE [DISTWIDTH] IN BLOOD: 13.1 % (ref 11–15)
FASTING DURATION TIME PATIENT: 0 HOURS (ref 0–999)
GLOBULIN SER-MCNC: 2.9 G/DL (ref 2–4)
GLUCOSE SERPL-MCNC: 96 MG/DL (ref 70–99)
HCT VFR BLD CALC: 38 % (ref 36–46.5)
HGB BLD-MCNC: 13.1 G/DL (ref 12–15.5)
IMM GRANULOCYTES # BLD AUTO: 0.1 K/MCL (ref 0–0.2)
IMM GRANULOCYTES # BLD: 1 %
LYMPHOCYTES # BLD: 2.6 K/MCL (ref 1–4)
LYMPHOCYTES NFR BLD: 32 %
MCH RBC QN AUTO: 29.1 PG (ref 26–34)
MCHC RBC AUTO-ENTMCNC: 34.5 G/DL (ref 32–36.5)
MCV RBC AUTO: 84.4 FL (ref 78–100)
MONOCYTES # BLD: 0.9 K/MCL (ref 0.3–0.9)
MONOCYTES NFR BLD: 10 %
NEUTROPHILS # BLD: 4.3 K/MCL (ref 1.8–7.7)
NEUTROPHILS NFR BLD: 53 %
NRBC BLD MANUAL-RTO: 0 /100 WBC
PLATELET # BLD AUTO: 280 K/MCL (ref 140–450)
POTASSIUM SERPL-SCNC: 4.1 MMOL/L (ref 3.4–5.1)
PROT SERPL-MCNC: 6.9 G/DL (ref 6.4–8.2)
RBC # BLD: 4.5 MIL/MCL (ref 4–5.2)
SODIUM SERPL-SCNC: 142 MMOL/L (ref 135–145)
WBC # BLD: 8.2 K/MCL (ref 4.2–11)

## 2024-03-28 ENCOUNTER — TELEPHONE (OUTPATIENT)
Dept: INTERNAL MEDICINE CLINIC | Facility: CLINIC | Age: 79
End: 2024-03-28

## 2024-03-28 NOTE — TELEPHONE ENCOUNTER
Previously prescribed by . Patient has now established care with . To  to please review and advise on the pending refill request

## 2024-04-01 RX ORDER — ATORVASTATIN CALCIUM 10 MG/1
10 TABLET, FILM COATED ORAL NIGHTLY
Qty: 90 TABLET | Refills: 3 | Status: SHIPPED | OUTPATIENT
Start: 2024-04-01

## 2024-04-02 ENCOUNTER — TELEPHONE (OUTPATIENT)
Dept: INTERNAL MEDICINE CLINIC | Facility: CLINIC | Age: 79
End: 2024-04-02

## 2024-04-02 NOTE — TELEPHONE ENCOUNTER
Attempted to call, no answer, left vm. Will attempt to call back later.     If provider calls back please call me out of a patient room to speak with her, thank you!

## 2024-04-02 NOTE — TELEPHONE ENCOUNTER
Dr. Chavez from [facility] called requesting to speak to Dr. Rivero in regards to patients lab results.    Please call her back on her cell #[phone]

## 2024-06-19 DIAGNOSIS — R92.8 ABNORMAL MAMMOGRAM: Primary | ICD-10-CM

## 2024-07-19 ENCOUNTER — HOSPITAL ENCOUNTER (OUTPATIENT)
Dept: ULTRASOUND IMAGING | Facility: HOSPITAL | Age: 79
Discharge: HOME | End: 2024-07-19
Attending: OBSTETRICS & GYNECOLOGY
Payer: MEDICARE

## 2024-07-19 ENCOUNTER — HOSPITAL ENCOUNTER (OUTPATIENT)
Dept: MAMMOGRAPHY | Facility: HOSPITAL | Age: 79
Discharge: HOME | End: 2024-07-19
Attending: OBSTETRICS & GYNECOLOGY
Payer: MEDICARE

## 2024-07-19 DIAGNOSIS — R92.8 ABNORMAL MAMMOGRAM: ICD-10-CM

## 2024-07-19 PROCEDURE — 76642 ULTRASOUND BREAST LIMITED: CPT | Performed by: OBSTETRICS & GYNECOLOGY

## 2024-07-19 PROCEDURE — 77065 DX MAMMO INCL CAD UNI: CPT | Performed by: OBSTETRICS & GYNECOLOGY

## 2024-07-19 PROCEDURE — 77061 BREAST TOMOSYNTHESIS UNI: CPT | Performed by: OBSTETRICS & GYNECOLOGY

## 2024-07-22 ENCOUNTER — TELEPHONE (OUTPATIENT)
Dept: OBGYN | Age: 79
End: 2024-07-22

## 2024-07-22 DIAGNOSIS — R92.8 ABNORMAL MAMMOGRAM: Primary | ICD-10-CM

## 2024-08-13 DIAGNOSIS — N95.2 ATROPHIC VAGINITIS: ICD-10-CM

## 2024-08-13 RX ORDER — LOSARTAN POTASSIUM 100 MG/1
100 TABLET ORAL DAILY
Qty: 90 TABLET | Refills: 3 | Status: SHIPPED | OUTPATIENT
Start: 2024-08-13

## 2024-08-13 RX ORDER — HYDROCHLOROTHIAZIDE 25 MG/1
25 TABLET ORAL DAILY
Qty: 90 TABLET | Refills: 3 | Status: SHIPPED | OUTPATIENT
Start: 2024-08-13

## 2024-08-13 RX ORDER — LEVOTHYROXINE SODIUM 88 UG/1
88 TABLET ORAL
Qty: 90 TABLET | Refills: 3 | Status: SHIPPED | OUTPATIENT
Start: 2024-08-13

## 2024-08-13 NOTE — TELEPHONE ENCOUNTER
Refill request is for a maintenance medication and has met the criteria specified in the Ambulatory Medication Refill Standing Order for eligibility, visits, laboratory, alerts and was sent to the requested pharmacy.    Requested Prescriptions     Signed Prescriptions Disp Refills    levothyroxine 88 MCG Oral Tab 90 tablet 3     Sig: Take 1 tablet (88 mcg total) by mouth before breakfast.     Authorizing Provider: JASON DOUGLASS     Ordering User: MANI WILKINSON    hydroCHLOROthiazide 25 MG Oral Tab 90 tablet 3     Sig: Take 1 tablet (25 mg total) by mouth daily.     Authorizing Provider: JASON DOUGLASS     Ordering User: MANI WILKINSON    losartan 100 MG Oral Tab 90 tablet 3     Sig: Take 1 tablet (100 mg total) by mouth daily.     Authorizing Provider: JASON DOUGLASS     Ordering User: MANI WILKINSON

## 2024-08-19 RX ORDER — ESTRADIOL 0.1 MG/G
1 CREAM VAGINAL
Qty: 42.5 G | Refills: 1 | Status: SHIPPED | OUTPATIENT
Start: 2024-08-19

## 2024-09-30 ENCOUNTER — TELEPHONE (OUTPATIENT)
Dept: INTERNAL MEDICINE CLINIC | Facility: CLINIC | Age: 79
End: 2024-09-30

## 2024-09-30 DIAGNOSIS — H91.92 DECREASED HEARING OF LEFT EAR: Primary | ICD-10-CM

## 2024-09-30 NOTE — TELEPHONE ENCOUNTER
Patient calling to request a referral for: Left ear plugged    Doctor first and last name: Dr. Hiro Dawn    Doctor specialty:   [] Dermatology  [] Ophthalmology  [] Neurology  [] Ortho  [] Podiatry  [] Gastroenterology  [] Rheumatology  [] Pulmonology  [] Cardiology  [] Nephrology  [] Urology  [] Gynecology   [] Physiatry   [x] ENT  [] Hematology  [] Oncology  [] other:    Reason for being seen: Left ear plugged    # of visits needed (estimation):    Appointment scheduled:  [] Yes, date:                  [x] No    Location of doctor's office:   [phone]    Fax #:
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
## 2024-10-04 ENCOUNTER — OFFICE VISIT (OUTPATIENT)
Dept: AUDIOLOGY | Facility: CLINIC | Age: 79
End: 2024-10-04

## 2024-10-04 ENCOUNTER — OFFICE VISIT (OUTPATIENT)
Dept: OTOLARYNGOLOGY | Facility: CLINIC | Age: 79
End: 2024-10-04

## 2024-10-04 VITALS — HEIGHT: 61 IN | BODY MASS INDEX: 30.21 KG/M2 | WEIGHT: 160 LBS

## 2024-10-04 DIAGNOSIS — H91.92 HEARING LOSS OF LEFT EAR, UNSPECIFIED HEARING LOSS TYPE: Primary | ICD-10-CM

## 2024-10-04 DIAGNOSIS — H93.12 TINNITUS, LEFT: ICD-10-CM

## 2024-10-04 DIAGNOSIS — H93.12 LEFT-SIDED TINNITUS: Primary | ICD-10-CM

## 2024-10-04 PROCEDURE — 99203 OFFICE O/P NEW LOW 30 MIN: CPT | Performed by: OTOLARYNGOLOGY

## 2024-10-04 RX ORDER — ALENDRONATE SODIUM 70 MG/1
TABLET ORAL
COMMUNITY

## 2024-10-04 RX ORDER — VALACYCLOVIR HYDROCHLORIDE 500 MG/1
500 TABLET, FILM COATED ORAL EVERY 8 HOURS
Qty: 21 TABLET | Refills: 0 | Status: SHIPPED | OUTPATIENT
Start: 2024-10-04 | End: 2024-10-11

## 2024-10-04 RX ORDER — PREDNISONE 10 MG/1
TABLET ORAL
Qty: 44 TABLET | Refills: 0 | Status: SHIPPED | OUTPATIENT
Start: 2024-10-04

## 2024-10-04 NOTE — PROGRESS NOTES
Anali Banks is a 78 year old female.    Chief Complaint   Patient presents with    Hearing Loss     Left ear hearing loss and buzzing sound, referred by pcp Dr. Rivero       HISTORY OF PRESENT ILLNESS  5 days ago on  she noted sudden onset of hearing loss in the left ear with some ringing.  States that the hearing has improved to some extent on that side.  Still feels that something is off on the left with ringing.  Audiogram was performed today in the office demonstrating normal downsloping to mild hearing bilaterally with a slight asymmetry on the left of about 10 to 15 dB at the greatest point.  Normal speech discrimination scores and tympanograms.      Social History     Socioeconomic History    Marital status:    Tobacco Use    Smoking status: Never     Passive exposure: Past (spouse quit smoking 3-4 years ago)    Smokeless tobacco: Never   Vaping Use    Vaping status: Never Used   Substance and Sexual Activity    Alcohol use: Yes     Comment: 1 glass of wine monthly    Drug use: No   Other Topics Concern    Caffeine Concern Yes     Comment: Coffee 1-2 cups daily; Soda    Pt has a pacemaker No    Pt has a defibrillator No    Reaction to local anesthetic No       Family History   Problem Relation Age of Onset    Heart Disease Mother          age 78-suddenly     Heart Disorder Mother     Heart Disease Father          age 93--\"old age\"    Stroke Sister          age 48-\"stroke or aneurysm\"    Obesity Sister     Thyroid disease Sister         hypothyroidism    Ear Problems Brother         deafness    Heart Disorder Brother         heart stopped in cold temps in [place]    Other (2 sisters, 2 brothers) Other     Other (3 children) Other        Past Medical History:    Degenerative joint disease (DJD) of hip    xray R hip and femur -mild DJD hip    DJD (degenerative joint disease), lumbar    xray lumbar--min DJD L3-4    Frozen shoulder    LT shoulder, manipulation under anesthesia     History of Papanicolaou smear of cervix    Hypertension, essential    Hypothyroidism    Internal hemorrhoids    colonoscopy -Dr Reid    Microscopic hematuria    eval ok in ; Re-eval in  Dr. Olivier CT urogram and cysto ok. re-eval .     Osteopenia    dexa  and 2011    PPD positive    + TB skin test in high school and had CXR and took meds for a summer    Pregnancy (HCC)    X 3 vaginal deliveries    Rosacea    Screening for heart disease    calcium heart score 0 in , calcium heart score 9 in 2017.    Strabismus       Past Surgical History:   Procedure Laterality Date    Colonoscopy      Dr Reid--int hemorrhoids    Colonoscopy  2018    Colonoscopy N/A 2018    Procedure: COLONOSCOPY, POSSIBLE BIOPSY, POSSIBLE POLYPECTOMY 40048;  Surgeon: Roberto Lyons MD;  Location: [facility]    Electrocardiogram, complete  2012    Scanned to media tab - DOS 2012    Eye surgery                 REVIEW OF SYSTEMS    System Neg/Pos Details   Constitutional Negative Fatigue, fever and weight loss.   ENMT Negative Drooling.   Eyes Negative Blurred vision and vision changes.   Respiratory Negative Dyspnea and wheezing.   Cardio Negative Chest pain, irregular heartbeat/palpitations and syncope.   GI Negative Abdominal pain and diarrhea.   Endocrine Negative Cold intolerance and heat intolerance.   Neuro Negative Tremors.   Psych Negative Anxiety and depression.   Integumentary Negative Frequent skin infections, pigment change and rash.   Hema/Lymph Negative Easy bleeding and easy bruising.           PHYSICAL EXAM    Ht 5' 1\" (1.549 m)   Wt 160 lb (72.6 kg)   BMI 30.23 kg/m²        Constitutional Normal Overall appearance - Normal.   Psychiatric Normal Orientation - Oriented to time, place, person & situation. Appropriate mood and affect.   Neck Exam Normal Inspection - Normal. Palpation - Normal. Parotid gland - Normal. Thyroid gland - Normal.   Eyes Normal  Conjunctiva - Right: Normal, Left: Normal. Pupil - Right: Normal, Left: Normal. Fundus - Right: Normal, Left: Normal.   Neurological Normal Memory - Normal. Cranial nerves - Cranial nerves II through XII grossly intact.   Head/Face Normal Facial features - Normal. Eyebrows - Normal. Skull - Normal.        Nasopharynx Normal External nose - Normal. Lips/teeth/gums - Normal. Tonsils - Normal. Oropharynx - Normal.   Ears Normal Inspection - Right: Normal, Left: Normal. Canal - Right: Normal, Left: Normal. TM - Right: Normal, Left: Normal.   Skin Normal Inspection - Normal.        Lymph Detail Normal Submental. Submandibular. Anterior cervical. Posterior cervical. Supraclavicular.        Nose/Mouth/Throat Normal External nose - Normal. Lips/teeth/gums - Normal. Tonsils - Normal. Oropharynx - Normal.   Nose/Mouth/Throat Normal Nares - Right: Normal Left: Normal. Septum -Normal  Turbinates - Right: Normal, Left: Normal.       Current Outpatient Medications:     alendronate 70 MG Oral Tab, every 28 days. FOR 21 DAYS IN, THEN REMOVE FOR 7 DAYS, Disp: , Rfl:     predniSONE 10 MG Oral Tab, 6 tablets daily day one through 5, 4 tablets daily on days  6 and 7, 2 tablets daily on days 8 and 9, One tablet daily on days 10 and 11., Disp: 44 tablet, Rfl: 0    valACYclovir 500 MG Oral Tab, Take 1 tablet (500 mg total) by mouth every 8 (eight) hours for 7 days., Disp: 21 tablet, Rfl: 0    levothyroxine 88 MCG Oral Tab, Take 1 tablet (88 mcg total) by mouth before breakfast., Disp: 90 tablet, Rfl: 3    hydroCHLOROthiazide 25 MG Oral Tab, Take 1 tablet (25 mg total) by mouth daily., Disp: 90 tablet, Rfl: 3    losartan 100 MG Oral Tab, Take 1 tablet (100 mg total) by mouth daily., Disp: 90 tablet, Rfl: 3    atorvastatin 10 MG Oral Tab, Take 1 tablet (10 mg total) by mouth nightly., Disp: 90 tablet, Rfl: 3    estradiol 0.1 MG/GM Vaginal Cream, Apply twice a week -per gynecologist Dr Chavez. (Patient not taking: Reported on 10/4/2024),  Disp: , Rfl: 0  ASSESSMENT AND PLAN    1. Hearing loss of left ear, unspecified hearing loss type  She appears to have sudden onset sensorineural hearing loss of the left ear.  Some spontaneous improvement in the past 5 days.  I have recommended starting her on prednisone burst and taper as well as Valtrex return to see me in 2 weeks with repeat audiogram.  - Audiology Referral - [place] ([facility])        This note was prepared using Dragon Medical voice recognition dictation software. As a result errors may occur. When identified these errors have been corrected. While every attempt is made to correct errors during dictation discrepancies may still exist    Hiro Dawn MD    10/4/2024    2:15 PM

## 2024-10-18 ENCOUNTER — OFFICE VISIT (OUTPATIENT)
Dept: AUDIOLOGY | Facility: CLINIC | Age: 79
End: 2024-10-18

## 2024-10-18 ENCOUNTER — OFFICE VISIT (OUTPATIENT)
Dept: OTOLARYNGOLOGY | Facility: CLINIC | Age: 79
End: 2024-10-18
Payer: MEDICARE

## 2024-10-18 DIAGNOSIS — H91.92 HEARING LOSS OF LEFT EAR, UNSPECIFIED HEARING LOSS TYPE: Primary | ICD-10-CM

## 2024-10-18 DIAGNOSIS — H69.91 DYSFUNCTION OF RIGHT EUSTACHIAN TUBE: ICD-10-CM

## 2024-10-18 DIAGNOSIS — H90.3 SENSORINEURAL HEARING LOSS (SNHL) OF BOTH EARS: ICD-10-CM

## 2024-10-18 DIAGNOSIS — H91.22 SUDDEN LEFT HEARING LOSS: Primary | ICD-10-CM

## 2024-10-18 DIAGNOSIS — H93.12 TINNITUS OF LEFT EAR: ICD-10-CM

## 2024-10-18 PROCEDURE — 92553 AUDIOMETRY AIR & BONE: CPT | Performed by: AUDIOLOGIST

## 2024-10-18 PROCEDURE — 99213 OFFICE O/P EST LOW 20 MIN: CPT | Performed by: OTOLARYNGOLOGY

## 2024-10-18 PROCEDURE — 92567 TYMPANOMETRY: CPT | Performed by: AUDIOLOGIST

## 2024-10-18 NOTE — PROGRESS NOTES
Anali Banks is a 79 year old female.    Chief Complaint   Patient presents with    Follow - Up     Patient is here for follow up of hearing loss reports improvement        HISTORY OF PRESENT ILLNESS  5 days ago on  she noted sudden onset of hearing loss in the left ear with some ringing.  States that the hearing has improved to some extent on that side.  Still feels that something is off on the left with ringing.  Audiogram was performed today in the office demonstrating normal downsloping to mild hearing bilaterally with a slight asymmetry on the left of about 10 to 15 dB at the greatest point.  Normal speech discrimination scores and tympanograms.     10/18/24 last visit she was started on prednisone burst and taper and Valtrex.  Today she did not a little bit of imbalance and feels her hearing is improved.  Audiogram was repeated today and demonstrates complete resolution of her left-sided hearing loss.      Social History     Socioeconomic History    Marital status:    Tobacco Use    Smoking status: Never     Passive exposure: Past (spouse quit smoking 3-4 years ago)    Smokeless tobacco: Never   Vaping Use    Vaping status: Never Used   Substance and Sexual Activity    Alcohol use: Yes     Comment: 1 glass of wine monthly    Drug use: No   Other Topics Concern    Caffeine Concern Yes     Comment: Coffee 1-2 cups daily; Soda    Pt has a pacemaker No    Pt has a defibrillator No    Reaction to local anesthetic No       Family History   Problem Relation Age of Onset    Heart Disease Mother          age 78-suddenly     Heart Disorder Mother     Heart Disease Father          age 93--\"old age\"    Stroke Sister          age 48-\"stroke or aneurysm\"    Obesity Sister     Thyroid disease Sister         hypothyroidism    Ear Problems Brother         deafness    Heart Disorder Brother         heart stopped in cold temps in [place]    Other (2 sisters, 2 brothers) Other     Other (3 children)  Other        Past Medical History:    Degenerative joint disease (DJD) of hip    xray R hip and femur -mild DJD hip    DJD (degenerative joint disease), lumbar    xray lumbar--min DJD L3-4    Frozen shoulder    LT shoulder, manipulation under anesthesia    History of Papanicolaou smear of cervix    Hypertension, essential    Hypothyroidism    Internal hemorrhoids    colonoscopy -Dr Reid    Microscopic hematuria    eval ok in ; Re-eval in  Dr. Olivier CT urogram and cysto ok. re-eval .     Osteopenia    dexa  and 2011    PPD positive    + TB skin test in high school and had CXR and took meds for a summer    Pregnancy (HCC)    X 3 vaginal deliveries    Rosacea    Screening for heart disease    calcium heart score 0 in , calcium heart score 9 in 2017.    Strabismus       Past Surgical History:   Procedure Laterality Date    Colonoscopy      Dr Reid--int hemorrhoids    Colonoscopy  2018    Colonoscopy N/A 2018    Procedure: COLONOSCOPY, POSSIBLE BIOPSY, POSSIBLE POLYPECTOMY 78354;  Surgeon: Roberto Lyons MD;  [facility]: [facility] [place]    Electrocardiogram, complete  2012    Scanned to media tab - DOS 2012    Eye surgery                 REVIEW OF SYSTEMS    System Neg/Pos Details   Constitutional Negative Fatigue, fever and weight loss.   ENMT Negative Drooling.   Eyes Negative Blurred vision and vision changes.   Respiratory Negative Dyspnea and wheezing.   Cardio Negative Chest pain, irregular heartbeat/palpitations and syncope.   GI Negative Abdominal pain and diarrhea.   Endocrine Negative Cold intolerance and heat intolerance.   Neuro Negative Tremors.   Psych Negative Anxiety and depression.   Integumentary Negative Frequent skin infections, pigment change and rash.   Hema/Lymph Negative Easy bleeding and easy bruising.           PHYSICAL EXAM    There were no vitals taken for this visit.       Constitutional Normal Overall appearance -  Normal.   Psychiatric Normal Orientation - Oriented to time, place, person & situation. Appropriate mood and affect.   Neck Exam Normal Inspection - Normal. Palpation - Normal. Parotid gland - Normal. Thyroid gland - Normal.   Eyes Normal Conjunctiva - Right: Normal, Left: Normal. Pupil - Right: Normal, Left: Normal. Fundus - Right: Normal, Left: Normal.   Neurological Normal Memory - Normal. Cranial nerves - Cranial nerves II through XII grossly intact.   Head/Face Normal Facial features - Normal. Eyebrows - Normal. Skull - Normal.        Nasopharynx Normal External nose - Normal. Lips/teeth/gums - Normal. Tonsils - Normal. Oropharynx - Normal.   Ears Normal Inspection - Right: Normal, Left: Normal. Canal - Right: Normal, Left: Normal. TM - Right: Normal, Left: Normal.   Skin Normal Inspection - Normal.        Lymph Detail Normal Submental. Submandibular. Anterior cervical. Posterior cervical. Supraclavicular.        Nose/Mouth/Throat Normal External nose - Normal. Lips/teeth/gums - Normal. Tonsils - Normal. Oropharynx - Normal.   Nose/Mouth/Throat Normal Nares - Right: Normal Left: Normal. Septum -Normal  Turbinates - Right: Normal, Left: Normal.       Current Outpatient Medications:     alendronate 70 MG Oral Tab, every 28 days. FOR 21 DAYS IN, THEN REMOVE FOR 7 DAYS, Disp: , Rfl:     predniSONE 10 MG Oral Tab, 6 tablets daily day one through 5, 4 tablets daily on days  6 and 7, 2 tablets daily on days 8 and 9, One tablet daily on days 10 and 11., Disp: 44 tablet, Rfl: 0    levothyroxine 88 MCG Oral Tab, Take 1 tablet (88 mcg total) by mouth before breakfast., Disp: 90 tablet, Rfl: 3    hydroCHLOROthiazide 25 MG Oral Tab, Take 1 tablet (25 mg total) by mouth daily., Disp: 90 tablet, Rfl: 3    losartan 100 MG Oral Tab, Take 1 tablet (100 mg total) by mouth daily., Disp: 90 tablet, Rfl: 3    atorvastatin 10 MG Oral Tab, Take 1 tablet (10 mg total) by mouth nightly., Disp: 90 tablet, Rfl: 3    estradiol 0.1 MG/GM  Vaginal Cream, Apply twice a week -per gynecologist Dr Chavez. (Patient not taking: Reported on 10/18/2024), Disp: , Rfl: 0  ASSESSMENT AND PLAN    1. Hearing loss of left ear, unspecified hearing loss type  Resolution of her left-sided hearing loss.  I did give her some balance exercises to do at home to help with any remaining dizziness issues.  Return to see me in 6 months for repeat audiogram.        This note was prepared using Dragon Medical voice recognition dictation software. As a result errors may occur. When identified these errors have been corrected. While every attempt is made to correct errors during dictation discrepancies may still exist    Hiro Dawn MD    10/18/2024    2:44 PM

## 2024-11-11 ENCOUNTER — OFFICE VISIT (OUTPATIENT)
Dept: INTERNAL MEDICINE CLINIC | Facility: CLINIC | Age: 79
End: 2024-11-11

## 2024-11-11 VITALS
SYSTOLIC BLOOD PRESSURE: 124 MMHG | BODY MASS INDEX: 31.34 KG/M2 | HEART RATE: 56 BPM | DIASTOLIC BLOOD PRESSURE: 76 MMHG | OXYGEN SATURATION: 98 % | WEIGHT: 166 LBS | HEIGHT: 61 IN

## 2024-11-11 DIAGNOSIS — M85.80 OSTEOPENIA, UNSPECIFIED LOCATION: ICD-10-CM

## 2024-11-11 DIAGNOSIS — Z00.00 MEDICARE ANNUAL WELLNESS VISIT, SUBSEQUENT: Primary | ICD-10-CM

## 2024-11-11 NOTE — PROGRESS NOTES
Subjective:   Anali Banks is a 79 year old female who presents for a Subsequent Annual Wellness visit (Pt already had Initial Annual Wellness) and scheduled follow up of multiple significant but stable problems.     LOV 10/9/23.    Since, she has been doing well.    Denies complaints or concerns today.    Seen by ENT for L sided hearing loss, notes reviewed. States hearing is now back to normal.    Hypertension - does not check bp at home.     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 Does a lot of walking around her neighborhood and her house in the winter.    Daughter is an RN who sometimes helps her understand [employer] messaging but prefers phone calls for results.     Has 5 grandsons and 1 granddaughter.    History/Other:   Fall Risk Assessment:   She has been screened for Falls and is low risk.      Cognitive Assessment:   She had a completely normal cognitive assessment - see flowsheet entries     Functional Ability/Status:   Anali Banks has some abnormal functions as listed below:  She has Vision problems based on screening of functional status.     Depression Screening (PHQ):  PHQ-2 SCORE: 0  , done 11/11/2024   Last [place] Suicide Screening on 11/11/2024 was No Risk.     5 minutes spent screening and counseling for depression    Advanced Directives:   She does have a Living Will but we do NOT have it on file in Epic.    She does have a POA but we do NOT have it on file in Epic.    Discussed Advance Care Planning with patient (and family/surrogate if present). Standard forms made available to patient in After Visit Summary.      Patient Active Problem List   Diagnosis    Essential hypertension    Hypothyroidism    Microscopic hematuria    Rosacea     Allergies:  She has No Known Allergies.    Current Medications:  Outpatient  Medications Marked as Taking for the 24 encounter (Office Visit) with Elza Rivero, DO   Medication Sig    alendronate 70 MG Oral Tab every 28 days. FOR 21 DAYS IN, THEN REMOVE FOR 7 DAYS    predniSONE 10 MG Oral Tab 6 tablets daily day one through 5, 4 tablets daily on days  6 and 7, 2 tablets daily on days 8 and 9, One tablet daily on days 10 and 11.    levothyroxine 88 MCG Oral Tab Take 1 tablet (88 mcg total) by mouth before breakfast.    hydroCHLOROthiazide 25 MG Oral Tab Take 1 tablet (25 mg total) by mouth daily.    losartan 100 MG Oral Tab Take 1 tablet (100 mg total) by mouth daily.    atorvastatin 10 MG Oral Tab Take 1 tablet (10 mg total) by mouth nightly.    estradiol 0.1 MG/GM Vaginal Cream Apply twice a week -per gynecologist Dr Chavez.       Medical History:  She  has a past medical history of Degenerative joint disease (DJD) of hip (), DJD (degenerative joint disease), lumbar (2016), Frozen shoulder, History of Papanicolaou smear of cervix (10-), Hypertension, essential, Hypothyroidism, Internal hemorrhoids (), Microscopic hematuria (), Osteopenia (), PPD positive, Pregnancy (), Rosacea (), Screening for heart disease ( & ), and Strabismus.  Surgical History:  She  has a past surgical history that includes Eye surgery; colonoscopy (); electrocardiogram, complete (2012); colonoscopy (2018); colonoscopy (N/A, 2018); and .   Family History:  Her family history includes 2 sisters, 2 brothers in an other family member; 3 children in an other family member; Ear Problems in her brother; Heart Disease in her father and mother; Heart Disorder in her brother and mother; Obesity in her sister; Stroke in her sister; Thyroid disease in her sister.  Social History:  She  reports that she has never smoked. She has been exposed to tobacco smoke. She has never used smokeless tobacco. She reports current alcohol use. She reports that she does not  use drugs.    Tobacco:  She has never smoked tobacco.    CAGE Alcohol Screen:   CAGE screening score of 0 on 11/11/2024, showing low risk of alcohol abuse.      Patient Care Team:  Elza Rivero DO as PCP - General  Lakshmi Chavez MD (OBSTETRICS & GYNECOLOGY)  Hiro Dawn MD (OTOLARYNGOLOGY)    Review of Systems  GENERAL: feels well otherwise  SKIN: denies any unusual skin lesions  EYES: denies blurred vision or double vision  HEENT: denies nasal congestion, sinus pain or ST  LUNGS: denies shortness of breath with exertion  CARDIOVASCULAR: denies chest pain on exertion  GI: denies abdominal pain, denies heartburn  : denies dysuria, hematuria  MUSCULOSKELETAL: denies back pain  NEURO: denies headaches  PSYCHE: denies depression     Objective:   Physical Exam  General Appearance:  Alert, cooperative, no distress, appears stated age   Head:  Normocephalic, without obvious abnormality, atraumatic   Eyes:  PERRL, conjunctiva/corneas clear, EOM's intact both eyes   Ears:  Normal TM's and external ear canals, both ears   Nose: Nares normal, septum midline,mucosa normal, no drainage or sinus tenderness   Throat: Lips, mucosa, and tongue normal; teeth and gums normal   Neck: Supple, symmetrical, trachea midline, no adenopathy;  thyroid: not enlarged, symmetric, no tenderness/mass/nodules; no carotid bruit or JVD       Lungs:   Clear to auscultation bilaterally, respirations unlabored   Heart:  Regular rate and rhythm, S1 and S2 normal, no m/r/g   Abdomen:   Soft, non-tender, bowel sounds active all four quadrants,  no masses, no organomegaly   Pelvic: Deferred   Extremities: Extremities normal, atraumatic, no c/c/e   Pulses: 2+ and symmetric   Skin: Skin color, texture, turgor normal, no rashes or lesions   Lymph nodes: Cervical, supraclavicular nodes normal   Neurologic: Normal       /76   Pulse 56   Ht 5' 1\" (1.549 m)   Wt 166 lb (75.3 kg)   SpO2 98%   BMI 31.37 kg/m²  Estimated body mass index is  31.37 kg/m² as calculated from the following:    Height as of this encounter: 5' 1\" (1.549 m).    Weight as of this encounter: 166 lb (75.3 kg).    Medicare Hearing Assessment:   Hearing Screening    Screening Method: Whisper Test  Whisper Test Result: Pass               Assessment & Plan:   Anali Banks is a 79 year old female who presents for a Medicare Assessment.     Medicare annual wellness visit, subsequent (Primary)  -     CBC With Differential With Platelet; Future; Expected date: 11/11/2024  -     Comp Metabolic Panel (14); Future; Expected date: 11/11/2024  -     TSH W Reflex To Free T4; Future; Expected date: 11/11/2024  -     Lipid Panel; Future; Expected date: 11/11/2024  -     Urinalysis with Culture Reflex; Future; Expected date: 11/11/2024  -     Vitamin D; Future; Expected date: 11/11/2024    Hypertension   - controlled on current regimen:  - losartan 100 mg daily  - hydrochlorothiazide 25 mg daily      Hypothyroidism  - on synthroid 88 mcg daily     Elevated CT Calcium Score  Hypercholesterolemia  - CT heart score of 9 on 9/23/17, started on Lipitor then.  - lipitor 10 mg at bedtime   - LDL 66 10/16/23, repeat as above     Hx Microhematuria  - had eval 2009 and May/June 2012 Dr. Olivier: CT urogram and cysto ok 2012.   - 10/10/17 CT abd pelvis results ok.    - CT done w/o contrast due to POCT creat 1.4, GFR 37.  - Cysto 12/2017 per Dr. Olivier ok      Osteopenia  - Gets calcium in diet, takes vit D 2000 u/d in winter, has not started yet  - alendronate rx per gyn takes on Saturdays    Obesity  - Recommend weight loss via diet and exercise     Alopecia  Hx of rosacea   - Seen by Dr. Rey for alopecia, on nutrafol and rogaine  - Has metrogel PRN     Healthcare Maintenance  Cancer Screenings:  - Breast: mammo birads 3 7/19/24, repeat ordered per gyn  - Cervical: Dr. Chavez rx estrogen cream.  - Colon: colonoscopy 11/20/18 w/Dr. Lyons: adenomatous polyps, diverticulosis, repeat in 5 years  (overdue) advised pt to call GI to schedule     Vaccines:  - Tdap: 8/24/20  - Shingles: shingrix recommended, available at pharmacy, previously declined.  - Pneumonia: pneumovax 23 6/26/17, prevnar 13 6/21/16, prevnar 20 recommended  - Flu: today, 11/11/24  - COVID-19: x2, recommend booster     Misc:  - DEXA: 1/19/24 ordered per gyn  - Advanced directives: discussed, has living will and HC POA. Recommended brining or sending in forms to office.    RTC in 1 year or sooner PRN.    Labs ordered as above. Informed patient to expect messaging only if the labs are abnormal via patient preferred method of communication (phone calls).    Overall 60 minutes was spent on this encounter. 45 minutes spent reviewing, providing care coordination, and documentation of the acute/chronic conditions as listed above. 15 minutes was spent reviewing elements of a AWV and providing age appropriate screenings.    The patient indicates understanding of these issues and agrees to the plan.  Reinforced healthy diet, lifestyle, and exercise.      No follow-ups on file.     Elza Rivero DO, 11/11/2024     Supplementary Documentation:   General Health:  In the past six months, have you lost more than 10 pounds without trying?: 2 - No  Has your appetite been poor?: No  Type of Diet: Balanced  How does the patient maintain a good energy level?: Appropriate Exercise;Stretching  How would you describe your daily physical activity?: Moderate  How would you describe your current health state?: Good  How do you maintain positive mental well-being?: Social Interaction;Games;Visiting Friends;Visiting Family  On a scale of 0 to 10, with 0 being no pain and 10 being severe pain, what is your pain level?: 0 - (None)  In the past six months, have you experienced urine leakage?: 0-No  At any time do you feel concerned for the safety/well-being of yourself and/or your children, in your home or elsewhere?: No  Have you had any immunizations at another  office such as Influenza, Hepatitis B, Tetanus, or Pneumococcal?: No    Health Maintenance   Topic Date Due    Zoster Vaccines (1 of 2) Never done    Colorectal Cancer Screening  11/20/2023    Annual Depression Screening  01/01/2024    COVID-19 Vaccine (3 - 2023-24 season) 09/01/2024    Influenza Vaccine (1) 10/01/2024    Annual Physical  10/09/2024    DEXA Scan  Completed    Fall Risk Screening (Annual)  Completed    Pneumococcal Vaccine: 65+ Years  Completed

## 2024-11-15 ENCOUNTER — LAB ENCOUNTER (OUTPATIENT)
Dept: LAB | Age: 79
End: 2024-11-15
Attending: INTERNAL MEDICINE
Payer: MEDICARE

## 2024-11-15 DIAGNOSIS — M85.80 OSTEOPENIA, UNSPECIFIED LOCATION: ICD-10-CM

## 2024-11-15 DIAGNOSIS — Z00.00 MEDICARE ANNUAL WELLNESS VISIT, SUBSEQUENT: ICD-10-CM

## 2024-11-15 LAB
ALBUMIN SERPL-MCNC: 4.7 G/DL (ref 3.2–4.8)
ALBUMIN/GLOB SERPL: 2 {RATIO} (ref 1–2)
ALP LIVER SERPL-CCNC: 75 U/L
ALT SERPL-CCNC: 9 U/L
ANION GAP SERPL CALC-SCNC: 7 MMOL/L (ref 0–18)
AST SERPL-CCNC: 22 U/L (ref ?–34)
BASOPHILS # BLD AUTO: 0.07 X10(3) UL (ref 0–0.2)
BASOPHILS NFR BLD AUTO: 0.8 %
BILIRUB SERPL-MCNC: 1 MG/DL (ref 0.2–1.1)
BILIRUB UR QL: NEGATIVE
BUN BLD-MCNC: 17 MG/DL (ref 9–23)
BUN/CREAT SERPL: 14.4 (ref 10–20)
CALCIUM BLD-MCNC: 9.6 MG/DL (ref 8.7–10.4)
CHLORIDE SERPL-SCNC: 106 MMOL/L (ref 98–112)
CHOLEST SERPL-MCNC: 154 MG/DL (ref ?–200)
CO2 SERPL-SCNC: 28 MMOL/L (ref 21–32)
CREAT BLD-MCNC: 1.18 MG/DL
DEPRECATED RDW RBC AUTO: 42.9 FL (ref 35.1–46.3)
EGFRCR SERPLBLD CKD-EPI 2021: 47 ML/MIN/1.73M2 (ref 60–?)
EOSINOPHIL # BLD AUTO: 0.17 X10(3) UL (ref 0–0.7)
EOSINOPHIL NFR BLD AUTO: 2 %
ERYTHROCYTE [DISTWIDTH] IN BLOOD BY AUTOMATED COUNT: 13.3 % (ref 11–15)
FASTING PATIENT LIPID ANSWER: YES
FASTING STATUS PATIENT QL REPORTED: YES
GLOBULIN PLAS-MCNC: 2.4 G/DL (ref 2–3.5)
GLUCOSE BLD-MCNC: 105 MG/DL (ref 70–99)
GLUCOSE UR-MCNC: NORMAL MG/DL
HCT VFR BLD AUTO: 39.1 %
HDLC SERPL-MCNC: 52 MG/DL (ref 40–59)
HGB BLD-MCNC: 13 G/DL
IMM GRANULOCYTES # BLD AUTO: 0.09 X10(3) UL (ref 0–1)
IMM GRANULOCYTES NFR BLD: 1.1 %
KETONES UR-MCNC: NEGATIVE MG/DL
LDLC SERPL CALC-MCNC: 87 MG/DL (ref ?–100)
LEUKOCYTE ESTERASE UR QL STRIP.AUTO: 500
LYMPHOCYTES # BLD AUTO: 2.55 X10(3) UL (ref 1–4)
LYMPHOCYTES NFR BLD AUTO: 29.9 %
MCH RBC QN AUTO: 29.3 PG (ref 26–34)
MCHC RBC AUTO-ENTMCNC: 33.2 G/DL (ref 31–37)
MCV RBC AUTO: 88.1 FL
MONOCYTES # BLD AUTO: 1.04 X10(3) UL (ref 0.1–1)
MONOCYTES NFR BLD AUTO: 12.2 %
NEUTROPHILS # BLD AUTO: 4.61 X10 (3) UL (ref 1.5–7.7)
NEUTROPHILS # BLD AUTO: 4.61 X10(3) UL (ref 1.5–7.7)
NEUTROPHILS NFR BLD AUTO: 54 %
NITRITE UR QL STRIP.AUTO: NEGATIVE
NONHDLC SERPL-MCNC: 102 MG/DL (ref ?–130)
OSMOLALITY SERPL CALC.SUM OF ELEC: 294 MOSM/KG (ref 275–295)
PH UR: 7.5 [PH] (ref 5–8)
PLATELET # BLD AUTO: 279 10(3)UL (ref 150–450)
POTASSIUM SERPL-SCNC: 4.1 MMOL/L (ref 3.5–5.1)
PROT SERPL-MCNC: 7.1 G/DL (ref 5.7–8.2)
PROT UR-MCNC: NEGATIVE MG/DL
RBC # BLD AUTO: 4.44 X10(6)UL
SODIUM SERPL-SCNC: 141 MMOL/L (ref 136–145)
SP GR UR STRIP: 1.01 (ref 1–1.03)
TRIGL SERPL-MCNC: 79 MG/DL (ref 30–149)
TSI SER-ACNC: 2.06 UIU/ML (ref 0.55–4.78)
UROBILINOGEN UR STRIP-ACNC: NORMAL
VIT D+METAB SERPL-MCNC: 35.8 NG/ML (ref 30–100)
VLDLC SERPL CALC-MCNC: 13 MG/DL (ref 0–30)
WBC # BLD AUTO: 8.5 X10(3) UL (ref 4–11)

## 2024-11-15 PROCEDURE — 82306 VITAMIN D 25 HYDROXY: CPT

## 2024-11-15 PROCEDURE — 80053 COMPREHEN METABOLIC PANEL: CPT

## 2024-11-15 PROCEDURE — 81001 URINALYSIS AUTO W/SCOPE: CPT

## 2024-11-15 PROCEDURE — 87086 URINE CULTURE/COLONY COUNT: CPT

## 2024-11-15 PROCEDURE — 36415 COLL VENOUS BLD VENIPUNCTURE: CPT

## 2024-11-15 PROCEDURE — 80061 LIPID PANEL: CPT

## 2024-11-15 PROCEDURE — 84443 ASSAY THYROID STIM HORMONE: CPT

## 2024-11-15 PROCEDURE — 85025 COMPLETE CBC W/AUTO DIFF WBC: CPT

## 2024-11-25 ENCOUNTER — TELEPHONE (OUTPATIENT)
Dept: INTERNAL MEDICINE CLINIC | Facility: CLINIC | Age: 79
End: 2024-11-25

## 2024-11-25 DIAGNOSIS — R31.29 MICROSCOPIC HEMATURIA: Primary | ICD-10-CM

## 2024-11-25 DIAGNOSIS — R73.01 IMPAIRED FASTING GLUCOSE: ICD-10-CM

## 2024-12-06 ENCOUNTER — LAB ENCOUNTER (OUTPATIENT)
Dept: LAB | Age: 79
End: 2024-12-06
Attending: INTERNAL MEDICINE
Payer: MEDICARE

## 2024-12-06 DIAGNOSIS — R73.01 IMPAIRED FASTING GLUCOSE: ICD-10-CM

## 2024-12-06 DIAGNOSIS — R31.29 MICROSCOPIC HEMATURIA: ICD-10-CM

## 2024-12-06 LAB
BILIRUB UR QL: NEGATIVE
CLARITY UR: CLEAR
EST. AVERAGE GLUCOSE BLD GHB EST-MCNC: 123 MG/DL (ref 68–126)
GLUCOSE UR-MCNC: NORMAL MG/DL
HBA1C MFR BLD: 5.9 % (ref ?–5.7)
KETONES UR-MCNC: NEGATIVE MG/DL
LEUKOCYTE ESTERASE UR QL STRIP.AUTO: 500
NITRITE UR QL STRIP.AUTO: NEGATIVE
PH UR: 6.5 [PH] (ref 5–8)
PROT UR-MCNC: NEGATIVE MG/DL
SP GR UR STRIP: 1.01 (ref 1–1.03)
UROBILINOGEN UR STRIP-ACNC: NORMAL

## 2024-12-06 PROCEDURE — 83036 HEMOGLOBIN GLYCOSYLATED A1C: CPT

## 2024-12-06 PROCEDURE — 81001 URINALYSIS AUTO W/SCOPE: CPT

## 2024-12-06 PROCEDURE — 36415 COLL VENOUS BLD VENIPUNCTURE: CPT

## 2024-12-06 PROCEDURE — 87086 URINE CULTURE/COLONY COUNT: CPT

## 2024-12-15 ENCOUNTER — TELEPHONE (OUTPATIENT)
Dept: INTERNAL MEDICINE CLINIC | Facility: CLINIC | Age: 79
End: 2024-12-15

## 2024-12-15 DIAGNOSIS — R31.29 MICROSCOPIC HEMATURIA: Primary | ICD-10-CM

## 2024-12-16 NOTE — TELEPHONE ENCOUNTER
Called patient and relayed  message , also info for DR. Dukes given to patient - she verbalized understanding

## 2024-12-16 NOTE — TELEPHONE ENCOUNTER
To nursing staff, please relay the following to Anali ADÁN Darren:    Hemoglobin A1c is elevated in pre-diabetic range. Recommend healthy diet, limiting excessive carbohydrate intake as able.    Urine sample showed a microscopic amount of blood. Recommend urology evaluation. Referral pended if needed.    Thank you!

## 2025-01-10 ENCOUNTER — HOSPITAL ENCOUNTER (OUTPATIENT)
Dept: MAMMOGRAPHY | Facility: HOSPITAL | Age: 80
Discharge: HOME | End: 2025-01-10
Attending: OBSTETRICS & GYNECOLOGY
Payer: MEDICARE

## 2025-01-10 DIAGNOSIS — R92.8 ABNORMAL MAMMOGRAM: ICD-10-CM

## 2025-01-10 PROCEDURE — 77062 BREAST TOMOSYNTHESIS BI: CPT | Performed by: OBSTETRICS & GYNECOLOGY

## 2025-01-10 PROCEDURE — 77066 DX MAMMO INCL CAD BI: CPT | Performed by: OBSTETRICS & GYNECOLOGY

## 2025-01-21 ENCOUNTER — OFFICE VISIT (OUTPATIENT)
Dept: SURGERY | Facility: CLINIC | Age: 80
End: 2025-01-21
Payer: MEDICARE

## 2025-01-21 DIAGNOSIS — R31.29 MICROSCOPIC HEMATURIA: Primary | ICD-10-CM

## 2025-01-21 PROCEDURE — 99203 OFFICE O/P NEW LOW 30 MIN: CPT | Performed by: UROLOGY

## 2025-01-21 NOTE — PROGRESS NOTES
Aimee Dukes MD  Department of Urology  [address] [place] [address] [address]  [place] [address]    T: [phone]  F: [phone]    To: Elza Rivero DO   [address] [facility] [address]    Re: Anali Banks   MRN: [id number]  : 10/17/1945    Dear Elza Rivero DO,    Today I had the pleasure of seeing Anali Banks in my clinic. As you know, Ms. Banks is a pleasant 79 year old year old female who I am seeing for hematuria. Patient was last seen in this department on Visit date not found.    Briefly, patient had a hematuria workup in 2017 which was negative.  She recently had a urine analysis that demonstrated microscopic hematuria in the absence of infection.  Her last genitourinary imaging was in 2017 the demonstrated fullness of the right renal pelvis.           PAST MEDICAL HISTORY:  Past Medical History:    Degenerative joint disease (DJD) of hip    xray R hip and femur -mild DJD hip    DJD (degenerative joint disease), lumbar    xray lumbar--min DJD L3-4    Frozen shoulder    LT shoulder, manipulation under anesthesia    History of Papanicolaou smear of cervix    Hypertension, essential    Hypothyroidism    Internal hemorrhoids    colonoscopy -Dr Reid    Microscopic hematuria    eval ok in ; Re-eval in  Dr. Olivier CT urogram and cysto ok. re-eval .     Osteopenia    dexa  and 2011    PPD positive    + TB skin test in high school and had CXR and took meds for a summer    Pregnancy (HCC)    X 3 vaginal deliveries    Rosacea    Screening for heart disease    calcium heart score 0 in , calcium heart score 9 in 2017.    Strabismus        PAST SURGICAL HISTORY:  Past Surgical History:   Procedure Laterality Date    Colonoscopy      Dr Reid--int hemorrhoids    Colonoscopy  2018    Colonoscopy N/A 2018    Procedure: COLONOSCOPY, POSSIBLE BIOPSY, POSSIBLE POLYPECTOMY 48781;  Surgeon: Roberto Lyons MD;  Location: [facility] [place]     Electrocardiogram, complete  2012    Scanned to media tab - DOS 2012    Eye surgery                 ALLERGIES:  Allergies[1]      MEDICATIONS:  Current Outpatient Medications   Medication Instructions    alendronate 70 MG Oral Tab Every 28 days    atorvastatin (LIPITOR) 10 mg, Oral, Nightly    estradiol 0.1 MG/GM Vaginal Cream Apply twice a week -per gynecologist Dr Chavez.    hydroCHLOROthiazide 25 mg, Oral, Daily    levothyroxine (SYNTHROID) 88 mcg, Oral, Before breakfast    losartan (COZAAR) 100 mg, Oral, Daily    predniSONE 10 MG Oral Tab 6 tablets daily day one through 5, 4 tablets daily on days  6 and 7, 2 tablets daily on days 8 and 9, One tablet daily on days 10 and 11.        FAMILY HISTORY:  Family History   Problem Relation Age of Onset    Heart Disease Mother          age 78-suddenly     Heart Disorder Mother     Heart Disease Father          age 93--\"old age\"    Stroke Sister          age 48-\"stroke or aneurysm\"    Obesity Sister     Thyroid disease Sister         hypothyroidism    Ear Problems Brother         deafness    Heart Disorder Brother         heart stopped in cold temps in [place]    Other (2 sisters, 2 brothers) Other     Other (3 children) Other     Breast Cancer Neg     Ovarian Cancer Neg     Prostate Cancer Neg     Pancreatic Cancer Neg         SOCIAL HISTORY:  Social History     Socioeconomic History    Marital status:    Tobacco Use    Smoking status: Never     Passive exposure: Past (spouse quit smoking 3-4 years ago)    Smokeless tobacco: Never   Vaping Use    Vaping status: Never Used   Substance and Sexual Activity    Alcohol use: Yes     Comment: 1 glass of wine monthly    Drug use: No   Other Topics Concern    Caffeine Concern Yes     Comment: Coffee 1-2 cups daily; Soda    Pt has a pacemaker No    Pt has a defibrillator No    Reaction to local anesthetic No          PHYSICAL EXAMINATION:  There were no vitals filed for this  visit.  CONSTITUTIONAL: No apparent distress, cooperative and communicative  NEUROLOGIC: Alert and oriented   HEAD: Normocephalic, atraumatic   EYES: Sclera non-icteric   ENT: Hearing intact, moist mucous membranes   NECK: No obvious goiter or masses   RESPIRATORY: Normal respiratory effort, Nonlabored breathing on room air  SKIN: No evident rashes   ABDOMEN: Soft, nontender, nondistended, no rebound tenderness, no guarding, no masses      REVIEW OF SYSTEMS:    A comprehensive 10-point review of systems was completed.  Pertinent positives and negatives are noted in the the HPI.       LABORATORY DATA:  URINE CULTURE No Growth at 18-24 hrs.        Urine Color  Yellow Light-Yellow   Clarity Urine  Clear Clear   Spec Gravity  1.005 - 1.030 1.010   Glucose Urine  Normal mg/dL Normal   Bilirubin Urine  Negative Negative   Ketones Urine  Negative mg/dL Negative   Blood Urine  Negative 1+ Abnormal    pH Urine  5.0 - 8.0 6.5   Protein Urine  Negative mg/dL Negative   Urobilinogen Urine  Normal Normal   Nitrite Urine  Negative Negative   Leukocyte Esterase Urine  Negative 500 Abnormal    Comment:  Camron/uL Interpretation  25          Trace  75          1+  250         2+  500         3+   WBC Urine  0 - 5 /HPF 6-10 Abnormal    RBC Urine  0 - 2 /HPF 3-5 Abnormal    Bacteria Urine  None Seen /HPF None Seen   Squamous Epi. Cells  None Seen /HPF Few Abnormal    Renal Tubular Epithelial Cells  None Seen /HPF None Seen   Transitional Cells  None Seen /HPF None Seen   Yeast Urine  None Seen /HPF None Seen           IMAGING REVIEW:  Narrative   PROCEDURE: [facility] CHICO 2D+3D DIAGNOSTIC [place] BILAT (CPT=77066/69784)     COMPARISON: [facility] BREAST RIGHT LIMITED (CPT=76642), 7/19/2024, 1:57 PM.  [facility] BREAST RIGHT LIMITED (CPT=76642), 1/19/2024, 2:01 PM.  [facility] CHICO 2D+3D  DIAGNOSTIC ADDL VWS  RIGHT (FID=[id number]), 1/19/2024, 12:30 PM.   [facility] CHICO 2D+3D SCREENING BILAT ([id number]), 1/16/2024, 2:45 PM.  [facility], [place] CHICO 2D+3D SCREENING BILAT ([id number]),   12/17/2021, 8:42 AM.  [facility], [place] CHICO 2D+3D SCREENING BILAT ([id number]), 11/20/2020, 3:01 PM.  [facility], [place] CHICO 2D+3D SCREENING BILAT ([id number]), 11/18/2019, 6:14 PM.  [facility] CHICO 2D+3D DIAGNOSTIC [place] RIGHT (SPU=[id number]), 7/19/2024, 1:09 PM.     INDICATIONS: R92.8 Abnormal mammogram.  History of a probably benign focal asymmetry in the outer anterior right breast that has been followed since January 2024. Additional history of a probably benign asymmetry in the central right breast that has been   followed since July 2024. No current complaints, previous breast surgery or family history of breast cancer.       TECHNIQUE: Digital diagnostic mammography was performed and images were reviewed with the [employer] Player 1.5.1.5 CAD device.  3D tomosynthesis was performed and reviewed.        BREAST COMPOSITION:   Category b-Scattered areas fibroglandular density.        FINDINGS:  A focal asymmetry in the outer anterior right breast is stable dating back to January 2024.     The asymmetry previously questioned in the central right breast on 07/19/2024 has resolved.     There are benign calcifications in both breasts including faint vascular calcifications.     There are no areas of architectural distortion or suspicious calcifications.                  Impression   CONCLUSION:    1. Asymmetry in the central right breast has resolved consistent with summation artifact.     2. Probably benign mammographic focal asymmetry in the outer anterior right breast is stable dating back to January 2024. A follow-up bilateral diagnostic mammogram is recommended in 12 months to confirm a 2 year time interval of stability, as well as to   screen the  contralateral breast.          BI-RADS CATEGORY:    DIAGNOSTIC CATEGORY 3 - PROBABLY BENIGN FINDING.       RECOMMENDATIONS:  DIAGNOSTIC FOLLOW-UP MAMMOGRAM BILATERAL BREASTS IN 12 MONTHS.           OTHER RELEVANT DATA:   none     IMPRESSION: Microscopic hematuria will plan for CT urogram, cystoscopy if microhematuria on clean midstream urine sample as prior UA was contaminated.     I discussed hematuria in detail both gross and microscopic.  I mentioned that it can come from anywhere the urine touches in the urinary tract including the kidneys, ureters, urethra, prostate in men, vagina/uterus in women.  I mention that there are benign causes like kidney stones, trauma, heavy exercise, idiopathic hematuria as well as more concerning causes like a kidney tumor, ureteral tumor or bladder tumor.        PLAN:  CT urogram - if microhematuria  Cystoscopy - if microhematuria  UA  microscopic    Thank you for referring this very pleasant patient to my clinic. If you have any questions or concerns, please do not hesitate to contact me.    Sincerely,  Aimee Dukes MD    30 minutes were spent on this patient at this visit obtaining a history, reviewing medical records, developing a treatment plan, counseling and discussing treatment strategy with patient, coordination of care and documentation.     The 21st Century Cures Act makes medical notes available to patients in the interest of transparency.  However, please be advised that this is a medical document.  It is intended as a peer to peer communication.  It is written in medical language and may contain abbreviations or verbiage that are technical and unfamiliar.  It may appear blunt or direct.  Medical documents are intended to carry relevant information, facts as evident, and the clinical opinion of the practitioner.         [1] No Known Allergies

## 2025-01-29 RX ORDER — ALENDRONATE SODIUM 70 MG/1
70 TABLET ORAL
Qty: 12 TABLET | Refills: 0 | Status: SHIPPED | OUTPATIENT
Start: 2025-01-29

## 2025-03-11 RX ORDER — ATORVASTATIN CALCIUM 10 MG/1
10 TABLET, FILM COATED ORAL NIGHTLY
Qty: 90 TABLET | Refills: 3 | Status: SHIPPED | OUTPATIENT
Start: 2025-03-11

## 2025-03-11 NOTE — TELEPHONE ENCOUNTER
Refill request is for a maintenance medication and has met the criteria specified in the Ambulatory Medication Refill Standing Order for eligibility, visits, laboratory, alerts and was sent to the requested pharmacy.    Requested Prescriptions     Signed Prescriptions Disp Refills    ATORVASTATIN 10 MG Oral Tab 90 tablet 3     Sig: TAKE 1 TABLET BY MOUTH EVERY DAY AT NIGHT     Authorizing Provider: JASON DOUGLASS     Ordering User: ERNESTINE HANNA

## 2025-04-01 ENCOUNTER — APPOINTMENT (OUTPATIENT)
Dept: OBGYN | Age: 80
End: 2025-04-01

## 2025-04-01 VITALS
SYSTOLIC BLOOD PRESSURE: 123 MMHG | BODY MASS INDEX: 29.86 KG/M2 | OXYGEN SATURATION: 98 % | HEART RATE: 63 BPM | HEIGHT: 62 IN | WEIGHT: 162.25 LBS | DIASTOLIC BLOOD PRESSURE: 80 MMHG | TEMPERATURE: 98.2 F

## 2025-04-01 DIAGNOSIS — Z01.419 ENCOUNTER FOR ROUTINE GYNECOLOGIC EXAMINATION IN MEDICARE PATIENT: ICD-10-CM

## 2025-04-01 DIAGNOSIS — N95.2 ATROPHIC VAGINITIS: ICD-10-CM

## 2025-04-01 DIAGNOSIS — R92.8 FOLLOW-UP EXAMINATION OF ABNORMAL MAMMOGRAM: Primary | ICD-10-CM

## 2025-04-01 ASSESSMENT — PATIENT HEALTH QUESTIONNAIRE - PHQ9
CLINICAL INTERPRETATION OF PHQ2 SCORE: NO FURTHER SCREENING NEEDED
SUM OF ALL RESPONSES TO PHQ9 QUESTIONS 1 AND 2: 0
2. FEELING DOWN, DEPRESSED OR HOPELESS: NOT AT ALL
SUM OF ALL RESPONSES TO PHQ9 QUESTIONS 1 AND 2: 0
1. LITTLE INTEREST OR PLEASURE IN DOING THINGS: NOT AT ALL

## 2025-04-03 RX ORDER — ALENDRONATE SODIUM 70 MG/1
70 TABLET ORAL
Qty: 12 TABLET | Refills: 3 | Status: SHIPPED | OUTPATIENT
Start: 2025-04-03

## 2025-04-03 RX ORDER — ESTRADIOL 0.1 MG/G
1 CREAM VAGINAL
Qty: 42.5 G | Refills: 1 | Status: SHIPPED | OUTPATIENT
Start: 2025-04-03

## 2025-04-18 ENCOUNTER — OFFICE VISIT (OUTPATIENT)
Dept: AUDIOLOGY | Facility: CLINIC | Age: 80
End: 2025-04-18

## 2025-04-18 ENCOUNTER — OFFICE VISIT (OUTPATIENT)
Dept: OTOLARYNGOLOGY | Facility: CLINIC | Age: 80
End: 2025-04-18
Payer: MEDICARE

## 2025-04-18 DIAGNOSIS — H91.92 HEARING LOSS OF LEFT EAR, UNSPECIFIED HEARING LOSS TYPE: ICD-10-CM

## 2025-04-18 DIAGNOSIS — H91.90 HEARING LOSS, UNSPECIFIED HEARING LOSS TYPE, UNSPECIFIED LATERALITY: Primary | ICD-10-CM

## 2025-04-18 DIAGNOSIS — H90.3 SENSORINEURAL HEARING LOSS, BILATERAL: ICD-10-CM

## 2025-04-18 DIAGNOSIS — H90.3 SENSORINEURAL HEARING LOSS (SNHL), BILATERAL: Primary | ICD-10-CM

## 2025-04-18 PROCEDURE — 92557 COMPREHENSIVE HEARING TEST: CPT | Performed by: AUDIOLOGIST

## 2025-04-18 PROCEDURE — 92567 TYMPANOMETRY: CPT | Performed by: AUDIOLOGIST

## 2025-04-18 PROCEDURE — 99213 OFFICE O/P EST LOW 20 MIN: CPT | Performed by: OTOLARYNGOLOGY

## 2025-04-18 NOTE — PROGRESS NOTES
Anali Banks is a 79 year old female.    Chief Complaint   Patient presents with    Follow - Up     Patient is here due to hearing loss follow up with repeat audio         HISTORY OF PRESENT ILLNESS  5 days ago on Sunday she noted sudden onset of hearing loss in the left ear with some ringing.  States that the hearing has improved to some extent on that side.  Still feels that something is off on the left with ringing.  Audiogram was performed today in the office demonstrating normal downsloping to mild hearing bilaterally with a slight asymmetry on the left of about 10 to 15 dB at the greatest point.  Normal speech discrimination scores and tympanograms.     10/18/24 last visit she was started on prednisone burst and taper and Valtrex.  Today she did not a little bit of imbalance and feels her hearing is improved.  Audiogram was repeated today and demonstrates complete resolution of her left-sided hearing loss.      4/18/25 history of sudden onset sensorineural hearing loss resolved with the use of steroids.  Last audiogram in October demonstrated resolution of her asymmetry.  No complaints or concerns feels that her hearing is stable.  Here for repeat audiogram.  Audiogram was performed and I did personally review the study and went over the results of this study in the previous study with the patient's office and this demonstrates essentially unchanged pure-tone thresholds in both ears.  Both ears are symmetric with normal tympanograms and speech discrimination scores.      Social Hx on file[1]    Family History[2]    Past Medical History[3]    Past Surgical History[4]      REVIEW OF SYSTEMS    System Neg/Pos Details   Constitutional Negative Fatigue, fever and weight loss.   ENMT Negative Drooling.   Eyes Negative Blurred vision and vision changes.   Respiratory Negative Dyspnea and wheezing.   Cardio Negative Chest pain, irregular heartbeat/palpitations and syncope.   GI Negative Abdominal pain and diarrhea.    Endocrine Negative Cold intolerance and heat intolerance.   Neuro Negative Tremors.   Psych Negative Anxiety and depression.   Integumentary Negative Frequent skin infections, pigment change and rash.   Hema/Lymph Negative Easy bleeding and easy bruising.           PHYSICAL EXAM    There were no vitals taken for this visit.       Constitutional Normal Overall appearance - Normal.   Psychiatric Normal Orientation - Oriented to time, place, person & situation. Appropriate mood and affect.   Neck Exam Normal Inspection - Normal. Palpation - Normal. Parotid gland - Normal. Thyroid gland - Normal.   Eyes Normal Conjunctiva - Right: Normal, Left: Normal. Pupil - Right: Normal, Left: Normal. Fundus - Right: Normal, Left: Normal.   Neurological Normal Memory - Normal. Cranial nerves - Cranial nerves II through XII grossly intact.   Head/Face Normal Facial features - Normal. Eyebrows - Normal. Skull - Normal.        Nasopharynx Normal External nose - Normal. Lips/teeth/gums - Normal. Tonsils - Normal. Oropharynx - Normal.   Ears Normal Inspection - Right: Normal, Left: Normal. Canal - Right: Normal, Left: Normal. TM - Right: Normal, Left: Normal.   Skin Normal Inspection - Normal.        Lymph Detail Normal Submental. Submandibular. Anterior cervical. Posterior cervical. Supraclavicular.        Nose/Mouth/Throat Normal External nose - Normal. Lips/teeth/gums - Normal. Tonsils - Normal. Oropharynx - Normal.   Nose/Mouth/Throat Normal Nares - Right: Normal Left: Normal. Septum -Normal  Turbinates - Right: Normal, Left: Normal.     Medications - Current[5]  ASSESSMENT AND PLAN    1. Hearing loss, unspecified hearing loss type, unspecified laterality  - Audiology Referral - [facility])    2. Hearing loss of left ear, unspecified hearing loss type  Previously resolved asymmetric hearing loss with stable pure-tone thresholds in both ears.  Normal downsloping to moderate hearing loss at the high frequencies.  I  did recommend she consider looking into amplification and I did ask her to contact her insurance to see what type of hearing aid benefits are available to her.  Return to see me in 1 to 2 years with repeat audiogram.        This note was prepared using Dragon [employer] voice recognition dictation software. As a result errors may occur. When identified these errors have been corrected. While every attempt is made to correct errors during dictation discrepancies may still exist    Hiro Dawn MD    2025    1:45 PM         [1]   Social History  Socioeconomic History    Marital status:    Tobacco Use    Smoking status: Never     Passive exposure: Past (spouse quit smoking 3-4 years ago)    Smokeless tobacco: Never   Vaping Use    Vaping status: Never Used   Substance and Sexual Activity    Alcohol use: Yes     Comment: 1 glass of wine monthly    Drug use: No   Other Topics Concern    Caffeine Concern Yes     Comment: Coffee 1-2 cups daily; Soda    Pt has a pacemaker No    Pt has a defibrillator No    Reaction to local anesthetic No   [2]   Family History  Problem Relation Age of Onset    Heart Disease Mother          age 78-suddenly     Heart Disorder Mother     Heart Disease Father          age 93--\"old age\"    Stroke Sister          age 48-\"stroke or aneurysm\"    Obesity Sister     Thyroid disease Sister         hypothyroidism    Ear Problems Brother         deafness    Heart Disorder Brother         heart stopped in cold temps in [place]    Other (2 sisters, 2 brothers) Other     Other (3 children) Other     Breast Cancer Neg     Ovarian Cancer Neg     Prostate Cancer Neg     Pancreatic Cancer Neg    [3]   Past Medical History:   Degenerative joint disease (DJD) of hip    xray R hip and femur -mild DJD hip    DJD (degenerative joint disease), lumbar    xray lumbar--min DJD L3-4    Frozen shoulder    LT shoulder, manipulation under anesthesia    History of Papanicolaou smear of cervix     Hypertension, essential    Hypothyroidism    Internal hemorrhoids    colonoscopy -Dr Reid    Microscopic hematuria    eval ok in ; Re-eval in  Dr. Olivier CT urogram and cysto ok. re-eval .     Osteopenia    dexa  and 2011    PPD positive    + TB skin test in high school and had CXR and took meds for a summer    Pregnancy (HCC)    X 3 vaginal deliveries    Rosacea    Screening for heart disease    calcium heart score 0 in , calcium heart score 9 in 2017.    Strabismus   [4]   Past Surgical History:  Procedure Laterality Date    Colonoscopy      Dr Reid--int hemorrhoids    Colonoscopy  2018    Colonoscopy N/A 2018    Procedure: COLONOSCOPY, POSSIBLE BIOPSY, POSSIBLE POLYPECTOMY 21542;  Surgeon: Roberto Lyons MD;  Location: [facility] [place], [facility]    Electrocardiogram, complete  2012    Scanned to media tab - DOS 2012    Eye surgery           [5]   Current Outpatient Medications:     ATORVASTATIN 10 MG Oral Tab, TAKE 1 TABLET BY MOUTH EVERY DAY AT NIGHT, Disp: 90 tablet, Rfl: 3    alendronate 70 MG Oral Tab, every 28 days. FOR 21 DAYS IN, THEN REMOVE FOR 7 DAYS, Disp: , Rfl:     predniSONE 10 MG Oral Tab, 6 tablets daily day one through 5, 4 tablets daily on days  6 and 7, 2 tablets daily on days 8 and 9, One tablet daily on days 10 and 11., Disp: 44 tablet, Rfl: 0    levothyroxine 88 MCG Oral Tab, Take 1 tablet (88 mcg total) by mouth before breakfast., Disp: 90 tablet, Rfl: 3    hydroCHLOROthiazide 25 MG Oral Tab, Take 1 tablet (25 mg total) by mouth daily., Disp: 90 tablet, Rfl: 3    losartan 100 MG Oral Tab, Take 1 tablet (100 mg total) by mouth daily., Disp: 90 tablet, Rfl: 3    estradiol 0.1 MG/GM Vaginal Cream, Apply twice a week -per gynecologist Dr Chavez., Disp: , Rfl: 0

## (undated) NOTE — LETTER
No referring provider defined for this encounter. 11/06/17        Patient: Pavel Banda   YOB: 1945   Date of Visit: 11/6/2017       Dear  Dr. Rocio Panchal MD,      Thank you for referring Pavel Banda to my practice.   Ple taken antibiotics before submitting urine culture 2 out of the 3 times.  She will receive further investigation such as repeat UA and urine culture as well as cystoscopy please see above.     (R31.29) Microhematuria  Patient had one UA on 10/02/2017 with RB

## (undated) NOTE — ED AVS SNAPSHOT
[facility] in [place] [address].  [place] [address]    Phone:  [phone]    Fax:  [address] [facility]   MRN: [id number]    Department:  [facility] in [address] [place] [address]   Date of Visit:  3 benefit level being available to you or other limited reimbursement. The physician may seek payment directly from you for amounts other than your deductible, co-payment, or co-insurance and for other services not covered under your health insurance plan. If you believe that any of the medications or instructions on this list is different from what your Primary Care doctor has instructed you - please continue to take your medications as instructed by your Primary Care doctor until you can check with your do [address] to help you get signed up for insurance coverage. [address] is a Federal Navigator program that can help with your Affordable Care Act coverage, as well as all types of Medicaid plans.   To get signed up and covere